# Patient Record
Sex: MALE | NOT HISPANIC OR LATINO | URBAN - METROPOLITAN AREA
[De-identification: names, ages, dates, MRNs, and addresses within clinical notes are randomized per-mention and may not be internally consistent; named-entity substitution may affect disease eponyms.]

---

## 2021-12-07 ENCOUNTER — OUTPATIENT (OUTPATIENT)
Dept: OUTPATIENT SERVICES | Facility: HOSPITAL | Age: 51
LOS: 1 days | Discharge: HOME | End: 2021-12-07

## 2022-10-19 ENCOUNTER — APPOINTMENT (OUTPATIENT)
Dept: NEUROSURGERY | Facility: CLINIC | Age: 52
End: 2022-10-19

## 2022-10-19 VITALS
DIASTOLIC BLOOD PRESSURE: 79 MMHG | OXYGEN SATURATION: 97 % | TEMPERATURE: 97 F | HEIGHT: 67 IN | WEIGHT: 180 LBS | SYSTOLIC BLOOD PRESSURE: 128 MMHG | HEART RATE: 84 BPM | BODY MASS INDEX: 28.25 KG/M2

## 2022-10-19 DIAGNOSIS — Z80.1 FAMILY HISTORY OF MALIGNANT NEOPLASM OF TRACHEA, BRONCHUS AND LUNG: ICD-10-CM

## 2022-10-19 PROCEDURE — 99203 OFFICE O/P NEW LOW 30 MIN: CPT

## 2022-10-19 RX ORDER — ALBUTEROL 90 MCG
AEROSOL (GRAM) INHALATION
Refills: 0 | Status: ACTIVE | COMMUNITY

## 2022-10-19 RX ORDER — METFORMIN HYDROCHLORIDE 625 MG/1
TABLET ORAL
Refills: 0 | Status: ACTIVE | COMMUNITY

## 2022-10-19 RX ORDER — CALCIUM CARBONATE/VITAMIN D3 600 MG-10
TABLET ORAL
Refills: 0 | Status: ACTIVE | COMMUNITY

## 2022-10-19 RX ORDER — LOSARTAN POTASSIUM 100 MG/1
TABLET, FILM COATED ORAL
Refills: 0 | Status: ACTIVE | COMMUNITY

## 2022-10-19 RX ORDER — ATORVASTATIN CALCIUM 80 MG/1
TABLET, FILM COATED ORAL
Refills: 0 | Status: ACTIVE | COMMUNITY

## 2022-10-19 NOTE — HISTORY OF PRESENT ILLNESS
[FreeTextEntry1] : Aneurysm [de-identified] : MARIELY CHENG is a 52 year old male with PMH of HTN, HLD, DM on metformin, no insulin, asthma, anxiety. Per patient report, father passed away from ruptured cerebral aneurysm in July 2022 at [facility]. His brother is a patient of ours as well who has history of cavernous aneurysm. PCP, Dr. Jarret Fishman ordered brain imaging due to extensive family history. 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 He is a former smoker of cigarettes, quit more than 5-7 years ago. He states he has occasional transient head pressure/cloudiness that he has had for a while for a couple years. He currently works at a  at a hotel.

## 2022-10-19 NOTE — ASSESSMENT
[FreeTextEntry1] : IMPRESSION:\par 52M with PMH of HTN, HLD, DM on metformin, asthma, and anxiety. Positive family history of ruptured cerebral aneurysm, father passed July 2022, brother with cavernous aneurysm. PCP, Dr. Jarret Fishman ordered MRA brain non con done 10/6 at [facility] demonstrating 7mm left ICA ophthalmic aneurysm. Former smoker of cigarettes, quit more than 5-7 years ago.\par \par Counseled patient on the natural history of aneurysms, discussed risk of aneurysm rupture with the patient and signs and symptoms of subarachnoid hemorrhage. Should he have severe, sudden onset worst headache of his life, advised that he must seek medical attention immediately and go to the emergency room if this occurs. \par Given we have limited dimensions/images from the MRA, I am not entirely sure about the size read on the official report of MRA.\par \par Recommend diagnostic cerebral angiogram as the initial step to evaluate unruptured aneurysm. The risks, benefits, alternatives, complications and personnel associated with the procedure were discussed with the patient in great detail. Explained that once we obtain more information from the angiogram, will consider treatment options for his particular aneurysm.Discussed possibility of endovascular embolization of the aneurysm with flow diversion. Would need to start aspirin 81mg daily and brilinta 90mg bid five-seven days before the procedure.\par  \par \par \par PLAN:\par PST and COVID PCR\par Schedule Diagnostic Cerebral Angiogram for next Thursday, 10/27/22\par

## 2022-10-19 NOTE — REVIEW OF SYSTEMS
[As Noted in HPI] : as noted in HPI [Negative] : Heme/Lymph [de-identified] : Occasional head pressure/cloudiness

## 2022-10-26 ENCOUNTER — OUTPATIENT (OUTPATIENT)
Dept: OUTPATIENT SERVICES | Facility: HOSPITAL | Age: 52
LOS: 1 days | End: 2022-10-26
Payer: MEDICAID

## 2022-10-26 VITALS
DIASTOLIC BLOOD PRESSURE: 95 MMHG | TEMPERATURE: 98 F | HEIGHT: 67 IN | WEIGHT: 182.1 LBS | RESPIRATION RATE: 14 BRPM | OXYGEN SATURATION: 97 % | SYSTOLIC BLOOD PRESSURE: 153 MMHG | HEART RATE: 87 BPM

## 2022-10-26 DIAGNOSIS — Z11.52 ENCOUNTER FOR SCREENING FOR COVID-19: ICD-10-CM

## 2022-10-26 DIAGNOSIS — I67.1 CEREBRAL ANEURYSM, NONRUPTURED: ICD-10-CM

## 2022-10-26 DIAGNOSIS — Z01.818 ENCOUNTER FOR OTHER PREPROCEDURAL EXAMINATION: ICD-10-CM

## 2022-10-26 DIAGNOSIS — Z98.890 OTHER SPECIFIED POSTPROCEDURAL STATES: Chronic | ICD-10-CM

## 2022-10-26 LAB
A1C WITH ESTIMATED AVERAGE GLUCOSE RESULT: 5.8 % — HIGH (ref 4–5.6)
ANION GAP SERPL CALC-SCNC: 13 MMOL/L — SIGNIFICANT CHANGE UP (ref 5–17)
BLD GP AB SCN SERPL QL: NEGATIVE — SIGNIFICANT CHANGE UP
BUN SERPL-MCNC: 13 MG/DL — SIGNIFICANT CHANGE UP (ref 7–23)
CALCIUM SERPL-MCNC: 10.3 MG/DL — SIGNIFICANT CHANGE UP (ref 8.4–10.5)
CHLORIDE SERPL-SCNC: 103 MMOL/L — SIGNIFICANT CHANGE UP (ref 96–108)
CO2 SERPL-SCNC: 23 MMOL/L — SIGNIFICANT CHANGE UP (ref 22–31)
CREAT SERPL-MCNC: 0.73 MG/DL — SIGNIFICANT CHANGE UP (ref 0.5–1.3)
EGFR: 110 ML/MIN/1.73M2 — SIGNIFICANT CHANGE UP
ESTIMATED AVERAGE GLUCOSE: 120 MG/DL — HIGH (ref 68–114)
GLUCOSE SERPL-MCNC: 120 MG/DL — HIGH (ref 70–99)
HCT VFR BLD CALC: 46.1 % — SIGNIFICANT CHANGE UP (ref 39–50)
HGB BLD-MCNC: 15.7 G/DL — SIGNIFICANT CHANGE UP (ref 13–17)
MCHC RBC-ENTMCNC: 32 PG — SIGNIFICANT CHANGE UP (ref 27–34)
MCHC RBC-ENTMCNC: 34.1 GM/DL — SIGNIFICANT CHANGE UP (ref 32–36)
MCV RBC AUTO: 94.1 FL — SIGNIFICANT CHANGE UP (ref 80–100)
NRBC # BLD: 0 /100 WBCS — SIGNIFICANT CHANGE UP (ref 0–0)
PLATELET # BLD AUTO: 209 K/UL — SIGNIFICANT CHANGE UP (ref 150–400)
POTASSIUM SERPL-MCNC: 3.5 MMOL/L — SIGNIFICANT CHANGE UP (ref 3.5–5.3)
POTASSIUM SERPL-SCNC: 3.5 MMOL/L — SIGNIFICANT CHANGE UP (ref 3.5–5.3)
RBC # BLD: 4.9 M/UL — SIGNIFICANT CHANGE UP (ref 4.2–5.8)
RBC # FLD: 12.3 % — SIGNIFICANT CHANGE UP (ref 10.3–14.5)
RH IG SCN BLD-IMP: POSITIVE — SIGNIFICANT CHANGE UP
SODIUM SERPL-SCNC: 139 MMOL/L — SIGNIFICANT CHANGE UP (ref 135–145)
WBC # BLD: 5.44 K/UL — SIGNIFICANT CHANGE UP (ref 3.8–10.5)
WBC # FLD AUTO: 5.44 K/UL — SIGNIFICANT CHANGE UP (ref 3.8–10.5)

## 2022-10-26 PROCEDURE — 85027 COMPLETE CBC AUTOMATED: CPT

## 2022-10-26 PROCEDURE — U0003: CPT

## 2022-10-26 PROCEDURE — 83036 HEMOGLOBIN GLYCOSYLATED A1C: CPT

## 2022-10-26 PROCEDURE — 86900 BLOOD TYPING SEROLOGIC ABO: CPT

## 2022-10-26 PROCEDURE — 80048 BASIC METABOLIC PNL TOTAL CA: CPT

## 2022-10-26 PROCEDURE — C9803: CPT

## 2022-10-26 PROCEDURE — 86850 RBC ANTIBODY SCREEN: CPT

## 2022-10-26 PROCEDURE — U0005: CPT

## 2022-10-26 PROCEDURE — 86901 BLOOD TYPING SEROLOGIC RH(D): CPT

## 2022-10-26 PROCEDURE — G0463: CPT

## 2022-10-26 NOTE — H&P PST ADULT - NEGATIVE NEUROLOGICAL SYMPTOMS
no transient paralysis/no weakness/no paresthesias/no syncope/no loss of sensation/no difficulty walking

## 2022-10-26 NOTE — H&P PST ADULT - HISTORY OF PRESENT ILLNESS
53 y/o M with PMHx of HTN, HLD, DM on metformin, asthma (intubated 1994 2/2 to exacerbation, after that well controlled with albuterol inhaler), and anxiety. Positive family history of ruptured cerebral aneurysm, father passed July 2022, brother with cavernous aneurysm. PCP, Dr. Jarret Fishman ordered MRA brain non con done 10/6 at [facility] demonstrating 7mm left ICA ophthalmic aneurysm. Former smoker of cigarettes, quit more than 5-7 years ago.  Recommend diagnostic cerebral angiogram as the initial step to evaluate unruptured aneurysm. Scheduled for Cerebral angiogram on 10/27/22.  Patient denies aspirin or Brilinta.   Patient denies SOB, CP, palpitation, blood in the stool or urine, N/V/D/C, dizziness, seizures, vision changes.  Endorses headaches that are chronic since childhood    Hx of Covid-19 Infx. 9/2022- fever, headache, SOB- home test- stayed, no need to go to the hospital or urgent care- Now back to baseline  Covid swab today (10/26/22)

## 2022-10-26 NOTE — H&P PST ADULT - PROBLEM SELECTOR PLAN 1
Dx: Cerebral angiogram on 10/27/22.  PST labs pending  AC: None  Covid swab- done today  Metformin- Last dose 10/24/22-PM

## 2022-10-26 NOTE — H&P PST ADULT - NSICDXPASTMEDICALHX_GEN_ALL_CORE_FT
PAST MEDICAL HISTORY:  Anxiety     Asthma     History of cerebral aneurysm     HLD (hyperlipidemia)     HTN (hypertension)

## 2022-10-26 NOTE — H&P PST ADULT - ANTERIOR CERVICAL R
Detail Level: Zone Render Risk Assessment In Note?: yes Note Text (......Xxx Chief Complaint.): This diagnosis correlates with the normal

## 2022-10-27 ENCOUNTER — APPOINTMENT (OUTPATIENT)
Dept: NEUROSURGERY | Facility: HOSPITAL | Age: 52
End: 2022-10-27

## 2022-10-27 ENCOUNTER — OUTPATIENT (OUTPATIENT)
Dept: OUTPATIENT SERVICES | Facility: HOSPITAL | Age: 52
LOS: 1 days | End: 2022-10-27
Payer: MEDICAID

## 2022-10-27 ENCOUNTER — TRANSCRIPTION ENCOUNTER (OUTPATIENT)
Age: 52
End: 2022-10-27

## 2022-10-27 VITALS
RESPIRATION RATE: 18 BRPM | SYSTOLIC BLOOD PRESSURE: 127 MMHG | DIASTOLIC BLOOD PRESSURE: 92 MMHG | HEART RATE: 74 BPM | TEMPERATURE: 98 F | OXYGEN SATURATION: 100 %

## 2022-10-27 VITALS
RESPIRATION RATE: 16 BRPM | OXYGEN SATURATION: 98 % | HEART RATE: 76 BPM | DIASTOLIC BLOOD PRESSURE: 81 MMHG | SYSTOLIC BLOOD PRESSURE: 133 MMHG

## 2022-10-27 DIAGNOSIS — I67.1 CEREBRAL ANEURYSM, NONRUPTURED: ICD-10-CM

## 2022-10-27 DIAGNOSIS — Z98.890 OTHER SPECIFIED POSTPROCEDURAL STATES: Chronic | ICD-10-CM

## 2022-10-27 LAB
GLUCOSE BLDC GLUCOMTR-MCNC: 124 MG/DL — HIGH (ref 70–99)
SARS-COV-2 RNA SPEC QL NAA+PROBE: SIGNIFICANT CHANGE UP

## 2022-10-27 PROCEDURE — C1887: CPT

## 2022-10-27 PROCEDURE — 36224 PLACE CATH CAROTD ART: CPT | Mod: 50

## 2022-10-27 PROCEDURE — 36227 PLACE CATH XTRNL CAROTID: CPT | Mod: 50

## 2022-10-27 PROCEDURE — 36227 PLACE CATH XTRNL CAROTID: CPT

## 2022-10-27 PROCEDURE — 82962 GLUCOSE BLOOD TEST: CPT

## 2022-10-27 PROCEDURE — 36224 PLACE CATH CAROTD ART: CPT

## 2022-10-27 PROCEDURE — 76377 3D RENDER W/INTRP POSTPROCES: CPT | Mod: 26

## 2022-10-27 PROCEDURE — 36226 PLACE CATH VERTEBRAL ART: CPT | Mod: RT

## 2022-10-27 PROCEDURE — C1894: CPT

## 2022-10-27 PROCEDURE — 36226 PLACE CATH VERTEBRAL ART: CPT

## 2022-10-27 PROCEDURE — C1769: CPT

## 2022-10-27 RX ORDER — SODIUM CHLORIDE 9 MG/ML
1000 INJECTION INTRAMUSCULAR; INTRAVENOUS; SUBCUTANEOUS
Refills: 0 | Status: DISCONTINUED | OUTPATIENT
Start: 2022-10-27 | End: 2022-11-10

## 2022-10-27 NOTE — CHART NOTE - NSCHARTNOTEFT_GEN_A_CORE
Neurointerventional Surgery Post Procedure Note    Procedure: Selective Cerebral Angiography     Pre- Procedure Diagnosis: Cerebral Aneurysm   Post- Procedure Diagnosis: Cerebral Aneurysm    Pre- Procedure Diagnosis: Arteriovenous malformation  Post- Procedure Diagnosis: Arteriovenous malformation    Pre- Procedure Diagnosis: Stroke   Post- Procedure Diagnosis: Stroke    : Dr. Terry BLANCO  Fellow: Dr. Enedelia BLANCO  Physician Assistant: Patty Guzmán PA-C  Nurse: RN  Anesthesiologist: Dr. Forest BLANCO, Obi Lakhani CRNA  Radiology Tech: Rosemary Cano RT, Arvind Wheeler RT     Sheath:  - [place]an Sheath    I/Os: estimated blood loss less than 10cc,  IV fluids cc, Urine output cc, Contrast: Omnipaque 240   cc, ABX    Vitals:  BP   HR   Spo2  %    Preliminary Report:  Under a 4 [place]an short sheath via the right groin under MAC sedation via left vertebral artery, left internal carotid artery, left external carotid artery, right vertebral artery, right internal carotid artery, right external carotid artery, a selective cerebral angiography  was performed and reveals       . ( Official note to follow).    Patient tolerated procedure well.  Patient remains hemodynamically stable, no change in neurological status compared to baseline.  Results were discussed with patient, patient's family and Neurosurgery.  Right groin sheath  was discontinued. Hemostasis was obtained with approximately 15 minutes of manual compression.     No active bleeding, no hematoma, no ecchymosis.   Quick clot and safeguard balloon dressing applied at   Patient transferred to recovery room in stable condition. Neurointerventional Surgery Post Procedure Note    Procedure: Selective Cerebral Angiography     Pre- Procedure Diagnosis: L ophthalmic artery aneurysm   Post- Procedure Diagnosis: L ophthalmic artery aneurysm     : Dr. Terry BLANCO  Fellow: Dr. Enedelia BLANCO  Physician Assistant: Patty Guzmán PA-C  Nurse: Aziza Luque RN  Anesthesiologist: Dr. Forest BLANCO, Obi Lakhani CRNA  Radiology Tech: Kareem Toribio RT, Arvind Wheeler RT     Sheath:  5 Lithuanian Sheath    I/Os: estimated blood loss less than 10cc,  IV fluids cc, Urine output cc, Contrast: Omnipaque 240  67 cc    Vitals:  /63  HR 65  Spo2  100%    Preliminary Report:  Under a 4 [place]ian short sheath via the right groin under MAC sedation via left vertebral artery, left internal carotid artery, left external carotid artery, right vertebral artery, right internal carotid artery, right external carotid artery, a selective cerebral angiography  was performed and reveals       . ( Official note to follow).    Patient tolerated procedure well.  Patient remains hemodynamically stable, no change in neurological status compared to baseline.  Results were discussed with patient, patient's family and Neurosurgery.  Right groin sheath  was discontinued. Hemostasis was obtained with approximately 15 minutes of manual compression.     No active bleeding, no hematoma, no ecchymosis.   Quick clot and safeguard balloon dressing applied at   Patient transferred to recovery room in stable condition. Neurointerventional Surgery Post Procedure Note    Procedure: Selective Cerebral Angiography     Pre- Procedure Diagnosis: L ophthalmic artery aneurysm   Post- Procedure Diagnosis: L ophthalmic artery aneurysm     : Dr. Terry BLANCO  Fellow: Dr. Enedelia BLANCO  Physician Assistant: Patty Guzmán PA-C  Nurse: Aziza Luque RN  Anesthesiologist: Dr. Forest BLANCO, Obi Lakhani CRNA  Radiology Tech: Kareem Toribio RT, Arvind Wheeler RT     Sheath:  5 [place] Sheath    I/Os: estimated blood loss less than 10cc,  IV fluids 300cc, Urine output 0cc, Contrast: Omnipaque 240  67 cc    Vitals:  /63  HR 65  Spo2  100%    Preliminary Report:  Under a 5 [place] short sheath via the right wrist under MAC sedation via left vertebral artery, left internal carotid artery, left external carotid artery, right internal carotid artery, a selective cerebral angiography  was performed and reveals L pcomm aneurysm. ( Official note to follow).    Patient tolerated procedure well.  Patient remains hemodynamically stable, no change in neurological status compared to baseline.  Results were discussed with patient, patient's family and Neurosurgery.  Right wrist sheath  was discontinued using radial band with 12cc inflated applied at 11am   No active bleeding, no hematoma, no ecchymosis.    Patient transferred to recovery room in stable condition.

## 2022-10-27 NOTE — CHART NOTE - NSCHARTNOTEFT_GEN_A_CORE
Neurointerventional Surgery  Pre-Procedure Note       HPI:  52M with PMH HTN, HLD, DM, asthma, anxiety, family history cerebral aneurysm who presents today for selective cerebral angiography. Patient reports that his father had ruptured intracranial aneurysm with SAH and passed away July 2022, and subsequently his brother was found to have cavernous aneurysm. Patient was then screened and found to have L ICA ophthalmic artery aneurysm. Patient presents toady for cerebral angiogram. Per patient he feels well today, has no acute symptoms including HA, weakness, numbness, tingling, N/V, CP, SOB.       Allergies: No Known Allergies      PAST MEDICAL & SURGICAL HISTORY:  History of cerebral aneurysm  HTN (hypertension)  HLD (hyperlipidemia)  Asthma  Anxiety  H/O colonoscopy, 2 years ago      FAMILY HISTORY:  Father with ruptured aneurysm, passed away July 2022  Brother with cavernous aneurysm       Physical Exam:  Constitutional: NAD, lying in bed  Neuro  * Mental Status:  GCS 15: Awake, alert, oriented to conversation. No aphasia or difficulty speaking. No dysarthria. Able to name objects and their function.  * Cranial Nerves: Cnii-Cnxii grossly intact. PERRL, EOMI, tongue midline, no gaze deviation  * Motor: RUE 5/5, LUE 5/5, RLE 5/5, LLE 5/5, no drift or dysmetria   * Sensory: Sensation intact to light touch  * Reflexes: not assessed   Cardiovascular: Regular rate and rhythm.  Eyes: See neurologic examination with detailed examination of eyes.  ENT: No JVD, Trachea Midline  Respiratory: non labored breathing   Gastrointestinal: Soft, nontender, nondistended.  Genitourinary: [ ] Mcdonnell, [ x ] No Mcdonnell.   Musculoskeletal: No muscle wasting noted, (See neurologic assessment for full muscle strength assessment) No pretibial edema appreciated, no appreciable calf tenderness.  Skin:  no wounds, no redness, no abrasions noted  Hematologic / Lymph / Immunologic: No bleeding from IV sites or wounds, No lymphadenopathy, No Hives or allergic type skin lesions      [facility] SS:  DATE: 10/27/22  TIME: 0930  1A: Level of consciousness (0-3): 0  1B: Questions (0-2): 0    1C: Commands (0-2): 0  2: Gaze (0-2): 0  3: Visual fields (0-3): 0  4: Facial palsy (0-3): 0  MOTOR:  5A: Left arm motor drift (0-4): 0  5B: Right arm motor drift (0-4): 0  6A: Left leg motor drift (0-4): 0  6B: Right leg motor drift (0-4): 0  7: Limb ataxia (0-2): 0  SENSORY:  8: Sensation (0-2): 0  SPEECH:  9: Language (0-3): 0  10: Dysarthria (0-2): 0  EXTINCTION:  11: Extinction/inattention (0-2): 0    TOTAL SCORE: 0      Labs:                         15.7   5.44  )-----------( 209      ( 26 Oct 2022 12:56 )             46.1       10-26    139  |  103  |  13  ----------------------------<  120<H>  3.5   |  23  |  0.73    Ca    10.3      26 Oct 2022 12:55      Assessment/Plan:   This is a 52y  year old right hand dominant Male  presents with L ICA ophthalmic artery aneurysm on MRA> Patient presents to neuro-IR for selective cerebral angiography.     Procedure, goals, risks, benefits and alternatives  were discussed with patient.  All questions were answered.  Risks include but are not limited to stroke, vessel injury, hemorrhage, and or groin hematoma.  Patient demonstrates understanding  of all risks involved with this procedure and wishes to continue.   Appropriate  consent was obtained from patient and consent is in the patient's chart.

## 2022-10-27 NOTE — ASU DISCHARGE PLAN (ADULT/PEDIATRIC) - NURSING INSTRUCTIONS
Please feel free to contact us at [phone] if any problems arise. After 6PM, Monday through Friday, on weekends and on holidays, please call [phone] and ask for the radiology resident on call to be paged.

## 2022-10-27 NOTE — ASU DISCHARGE PLAN (ADULT/PEDIATRIC) - CARE PROVIDER_API CALL
Anderson Stewart (MD; MS)  Unallocated  [address] [facility], [address] [place] [id number]  Phone: [phone]  Fax: [phone]  Follow Up Time: 2 weeks

## 2022-10-27 NOTE — ASU DISCHARGE PLAN (ADULT/PEDIATRIC) - NS MD DC FALL RISK RISK
Addended by: MONTEZ NAVARRO on: 4/22/2022 02:29 PM     Modules accepted: Orders     For information on Fall & Injury Prevention, visit: [email or web address] OR  [email or web address] OR  https://www.cdc.gov/steadi/patient.html

## 2022-11-02 PROBLEM — E78.5 HYPERLIPIDEMIA, UNSPECIFIED: Chronic | Status: ACTIVE | Noted: 2022-10-26

## 2022-11-02 PROBLEM — J45.909 UNSPECIFIED ASTHMA, UNCOMPLICATED: Chronic | Status: ACTIVE | Noted: 2022-10-26

## 2022-11-02 PROBLEM — I10 ESSENTIAL (PRIMARY) HYPERTENSION: Chronic | Status: ACTIVE | Noted: 2022-10-26

## 2022-11-02 PROBLEM — Z86.79 PERSONAL HISTORY OF OTHER DISEASES OF THE CIRCULATORY SYSTEM: Chronic | Status: ACTIVE | Noted: 2022-10-26

## 2022-11-09 ENCOUNTER — APPOINTMENT (OUTPATIENT)
Dept: NEUROSURGERY | Facility: CLINIC | Age: 52
End: 2022-11-09

## 2022-11-09 DIAGNOSIS — I67.1 CEREBRAL ANEURYSM, NONRUPTURED: ICD-10-CM

## 2022-11-09 PROCEDURE — 99214 OFFICE O/P EST MOD 30 MIN: CPT | Mod: 95

## 2022-11-11 NOTE — HISTORY OF PRESENT ILLNESS
[FreeTextEntry1] : MARIELY CHENG is a 52 year old male with PMH of HTN, HLD, DM on metformin, no insulin, asthma, anxiety. Per patient report, father passed away from ruptured cerebral aneurysm in July 2022 at [facility]. His brother is a patient of ours as well who has history of cavernous aneurysm. PCP, Dr. Jarret Fishman ordered brain imaging due to extensive family history. 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 He is a former smoker of cigarettes, quit more than 5-7 years ago. He states he has occasional transient head pressure/cloudiness that he has had for a while for a couple years. On 10/27/22, he underwent diagnostic catheter cerebral angiogram demonstrating 5.6mm left ICA ophthalmic artery aneurysm.\par \par Today on his post-hospitalization visit after his angiogram, he is neurologically intact, doing well with no acute complaints. Denies issues with puncture site. He changed his visit from in person to telehealth due to his recent ankle injury.

## 2022-11-11 NOTE — ASSESSMENT
[FreeTextEntry1] : IMPRESSION:\par 52M with PMH of HTN, HLD, DM on metformin, asthma, and anxiety. Positive family history of ruptured cerebral aneurysm, father passed July 2022, brother with cavernous aneurysm. PCP, Dr. Jarret Fishman ordered MRA brain non con done 10/6 at [facility] demonstrating 7mm left ICA ophthalmic aneurysm. Former smoker of cigarettes, quit more than 5-7 years ago. s/p dx angio 10/27 demonstrating 5.6mm left ICA ophthalmic aneurysm. \par \par Counseled patient on the natural history of aneurysms, discussed risk of aneurysm rupture with the patient and signs and symptoms of subarachnoid hemorrhage. Should he have severe, sudden onset worst headache of his life, advised that he must seek medical attention immediately and go to the emergency room if this occurs.\par \par Discussed management options moving forward which include conservative management with serial scans. The risk of aneurysm rupture in his case is about 3% per year. \par Explained that the results of the angiogram are not as straight forward as expected. With the family history, we tend to lean more on the aggressive end in terms of treatment. Given the morphology and anatomical structure of the aneurysm, recommend endovascular embolization of the aneurysm with flow diverting stent. Assured there is no immediate urgency to proceeding with treatment. The risks, benefits, alternatives, complications and personnel associated with the procedure were discussed with the patient in great detail. Because of the ophthalmic artery comes right out of the aneurysm, there is a risk of it affecting his vision. Patient reports baseline vision issues in his left eye. This endovascular procedure requires starting Aspirin 81mg daily and Brilinta 90mg BID 5-7 days before the procedure and for 6 months following the procedure. \par  \par \par PLAN:\par If his ankle injury necessitates surgery, recommend undergoing surgical treatment for his ankle first prior to starting blood thinners that is required to be started in preparation for eventual endovascular embolization\par Plan to follow up with patient in the next couple weeks after his work-up of his ankle\par Tentatively schedule cerebral angiogram and embolization of aneurysm \par

## 2022-11-11 NOTE — REASON FOR VISIT
[Home] : at home, [unfilled] , at the time of the visit. [Medical Office: ([facility])___] : at the medical office located in  [Patient] : the patient [Self] : self [Post Hospitalization] : a post hospitalization visit [This encounter was initiated by telehealth (audio with video) and converted to telephone (audio only) due to technical difficulties.] : This encounter was initiated by telehealth (audio with video) and converted to telephone (audio only) due to technical difficulties. [FreeTextEntry1] : s/p Diagnostic cerebral angiogram 10/27/22

## 2023-01-19 ENCOUNTER — NON-APPOINTMENT (OUTPATIENT)
Age: 53
End: 2023-01-19

## 2023-01-24 ENCOUNTER — OUTPATIENT (OUTPATIENT)
Dept: OUTPATIENT SERVICES | Facility: HOSPITAL | Age: 53
LOS: 1 days | End: 2023-01-24
Payer: MEDICAID

## 2023-01-24 VITALS
HEIGHT: 67 IN | OXYGEN SATURATION: 97 % | SYSTOLIC BLOOD PRESSURE: 138 MMHG | RESPIRATION RATE: 16 BRPM | HEART RATE: 86 BPM | WEIGHT: 186.07 LBS | TEMPERATURE: 98 F | DIASTOLIC BLOOD PRESSURE: 77 MMHG

## 2023-01-24 DIAGNOSIS — E11.9 TYPE 2 DIABETES MELLITUS WITHOUT COMPLICATIONS: ICD-10-CM

## 2023-01-24 DIAGNOSIS — I67.1 CEREBRAL ANEURYSM, NONRUPTURED: ICD-10-CM

## 2023-01-24 DIAGNOSIS — Z98.890 OTHER SPECIFIED POSTPROCEDURAL STATES: Chronic | ICD-10-CM

## 2023-01-24 DIAGNOSIS — Z11.52 ENCOUNTER FOR SCREENING FOR COVID-19: ICD-10-CM

## 2023-01-24 DIAGNOSIS — Z01.818 ENCOUNTER FOR OTHER PREPROCEDURAL EXAMINATION: ICD-10-CM

## 2023-01-24 LAB
A1C WITH ESTIMATED AVERAGE GLUCOSE RESULT: 5.8 % — HIGH (ref 4–5.6)
ANION GAP SERPL CALC-SCNC: 14 MMOL/L — SIGNIFICANT CHANGE UP (ref 5–17)
BLD GP AB SCN SERPL QL: NEGATIVE — SIGNIFICANT CHANGE UP
BUN SERPL-MCNC: 13 MG/DL — SIGNIFICANT CHANGE UP (ref 7–23)
CALCIUM SERPL-MCNC: 10.2 MG/DL — SIGNIFICANT CHANGE UP (ref 8.4–10.5)
CHLORIDE SERPL-SCNC: 100 MMOL/L — SIGNIFICANT CHANGE UP (ref 96–108)
CO2 SERPL-SCNC: 23 MMOL/L — SIGNIFICANT CHANGE UP (ref 22–31)
CREAT SERPL-MCNC: 0.8 MG/DL — SIGNIFICANT CHANGE UP (ref 0.5–1.3)
EGFR: 107 ML/MIN/1.73M2 — SIGNIFICANT CHANGE UP
ESTIMATED AVERAGE GLUCOSE: 120 MG/DL — HIGH (ref 68–114)
GLUCOSE SERPL-MCNC: 117 MG/DL — HIGH (ref 70–99)
HCT VFR BLD CALC: 48.7 % — SIGNIFICANT CHANGE UP (ref 39–50)
HGB BLD-MCNC: 16.7 G/DL — SIGNIFICANT CHANGE UP (ref 13–17)
MCHC RBC-ENTMCNC: 30.9 PG — SIGNIFICANT CHANGE UP (ref 27–34)
MCHC RBC-ENTMCNC: 34.3 GM/DL — SIGNIFICANT CHANGE UP (ref 32–36)
MCV RBC AUTO: 90.2 FL — SIGNIFICANT CHANGE UP (ref 80–100)
NRBC # BLD: 0 /100 WBCS — SIGNIFICANT CHANGE UP (ref 0–0)
PA ADP PRP-ACNC: 5 PRU — LOW (ref 194–417)
PLATELET # BLD AUTO: 220 K/UL — SIGNIFICANT CHANGE UP (ref 150–400)
PLATELET RESPONSE ASPIRIN RESULT: 350 ARU — SIGNIFICANT CHANGE UP (ref 350–700)
POTASSIUM SERPL-MCNC: 3.3 MMOL/L — LOW (ref 3.5–5.3)
POTASSIUM SERPL-SCNC: 3.3 MMOL/L — LOW (ref 3.5–5.3)
RBC # BLD: 5.4 M/UL — SIGNIFICANT CHANGE UP (ref 4.2–5.8)
RBC # FLD: 12.1 % — SIGNIFICANT CHANGE UP (ref 10.3–14.5)
RH IG SCN BLD-IMP: POSITIVE — SIGNIFICANT CHANGE UP
SARS-COV-2 RNA SPEC QL NAA+PROBE: SIGNIFICANT CHANGE UP
SODIUM SERPL-SCNC: 137 MMOL/L — SIGNIFICANT CHANGE UP (ref 135–145)
WBC # BLD: 6.52 K/UL — SIGNIFICANT CHANGE UP (ref 3.8–10.5)
WBC # FLD AUTO: 6.52 K/UL — SIGNIFICANT CHANGE UP (ref 3.8–10.5)

## 2023-01-24 PROCEDURE — C9803: CPT

## 2023-01-24 PROCEDURE — G0463: CPT

## 2023-01-24 PROCEDURE — 83036 HEMOGLOBIN GLYCOSYLATED A1C: CPT

## 2023-01-24 PROCEDURE — 86850 RBC ANTIBODY SCREEN: CPT

## 2023-01-24 PROCEDURE — 86900 BLOOD TYPING SEROLOGIC ABO: CPT

## 2023-01-24 PROCEDURE — 86901 BLOOD TYPING SEROLOGIC RH(D): CPT

## 2023-01-24 PROCEDURE — U0003: CPT

## 2023-01-24 PROCEDURE — U0005: CPT

## 2023-01-24 PROCEDURE — 80048 BASIC METABOLIC PNL TOTAL CA: CPT

## 2023-01-24 PROCEDURE — 85576 BLOOD PLATELET AGGREGATION: CPT

## 2023-01-24 PROCEDURE — 85027 COMPLETE CBC AUTOMATED: CPT

## 2023-01-24 RX ORDER — ATORVASTATIN CALCIUM 80 MG/1
1 TABLET, FILM COATED ORAL
Qty: 0 | Refills: 0 | DISCHARGE

## 2023-01-24 NOTE — H&P PST ADULT - PROBLEM SELECTOR PLAN 1
Dx: Cerebral angiogram on 10/27/22.  PST labs pending  AC: None  Covid swab- done today  Metformin- Last dose 10/24/22-PM cerebral angiogram and embolization  PST labs send  preprocedure surgical scrub instructions discussed   continue Aspirin and Brilinta am of sx

## 2023-01-24 NOTE — H&P PST ADULT - NSICDXPASTMEDICALHX_GEN_ALL_CORE_FT
PAST MEDICAL HISTORY:  2019 novel coronavirus disease (COVID-19)     Ankle injury     Anxiety     Anxiety and depression     Asthma     DM2 (diabetes mellitus, type 2)     History of cerebral aneurysm     HLD (hyperlipidemia)     HTN (hypertension)      PAST MEDICAL HISTORY:  2019 novel coronavirus disease (COVID-19)     Ankle injury     Anxiety     Anxiety and depression     Asthma     DM2 (diabetes mellitus, type 2)     Fall     History of cerebral aneurysm     HLD (hyperlipidemia)     HTN (hypertension)

## 2023-01-24 NOTE — H&P PST ADULT - PRIMARY CARE PROVIDER
Sravanthi Orcnfrg-[phone] last visit 2 weeks ago Sravanthi Mysahjv-[phone] last visit medical 2 weeks ago

## 2023-01-24 NOTE — H&P PST ADULT - GASTROINTESTINAL
negative soft/nontender/nondistended/no masses palpable soft/nontender/normal active bowel sounds/no masses palpable

## 2023-01-24 NOTE — H&P PST ADULT - HISTORY OF PRESENT ILLNESS
51 y/o M with PMHx of HTN, HLD, DM on metformin, asthma (intubated 1994 2/2 to exacerbation, after that well controlled with albuterol inhaler ), covid 19 mild 9/2022 and anxiety. Positive family history of ruptured cerebral aneurysm, father passed July 2022, brother with cavernous aneurysm. PCP, MRA brain 10/2022 7mm left ICA ophthalmic aneurysm s/p diagnostic angiogram 10/27/22 demonstrating 5.6mm left ICA ophthalmic aneurysm now planned for cerebral angiogram and embolization 1/26/23    Patient denies SOB, CP, palpitation, blood in the stool or urine, N/V/D/C, dizziness, seizures, vision changes.  Covid swab 1/24 @ [facility]  denies any recent covid infection or exposure       53 y/o M with PMHx of HTN, HLD, DM on metformin, asthma (intubated 1994 2/2 to exacerbation, after that well controlled with albuterol inhaler ), covid 19 mild 9/2022 and anxiety. 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 blount   Patient denies SOB, CP, palpitation,  N/V/D/C, dizziness, seizures, vision changes.  Covid swab 1/24 @ [facility]  denies any recent covid infection or exposure       53 y/o M with PMHx of HTN, HLD, DM2 on metformin, asthma (intubated 1994 2/2 to exacerbation, after that controlled with albuterol inhaler as needed ), covid 19 mild 9/2022 and anxiety. 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 blount   Patient denies SOB, CP, palpitation,  N/V/D/C, dizziness, seizures, vision changes.  Covid swab 1/24 @ [facility]  denies any recent covid infection or exposure

## 2023-01-24 NOTE — H&P PST ADULT - PSYCHIATRIC
negative normal/normal affect/alert and oriented x3/normal behavior normal affect/alert and oriented x3/normal behavior

## 2023-01-24 NOTE — H&P PST ADULT - MUSCULOSKELETAL
ROM intact/no joint swelling/no joint erythema/no joint warmth/no calf tenderness/normal gait/strength 5/5 bilateral upper extremities/strength 5/5 bilateral lower extremities negative no calf tenderness/normal gait/back exam

## 2023-01-24 NOTE — H&P PST ADULT - RESPIRATORY
clear to auscultation bilaterally/respirations non-labored clear to auscultation bilaterally/airway patent/good air movement/respirations non-labored

## 2023-01-26 ENCOUNTER — APPOINTMENT (OUTPATIENT)
Dept: NEUROSURGERY | Facility: HOSPITAL | Age: 53
End: 2023-01-26

## 2023-01-26 ENCOUNTER — INPATIENT (INPATIENT)
Facility: HOSPITAL | Age: 53
LOS: 0 days | Discharge: HOME | DRG: 27 | End: 2023-01-27
Attending: NEUROLOGICAL SURGERY | Admitting: NEUROLOGICAL SURGERY
Payer: MEDICAID

## 2023-01-26 VITALS
OXYGEN SATURATION: 98 % | SYSTOLIC BLOOD PRESSURE: 153 MMHG | DIASTOLIC BLOOD PRESSURE: 91 MMHG | HEIGHT: 67 IN | RESPIRATION RATE: 15 BRPM | WEIGHT: 186.07 LBS | TEMPERATURE: 98 F | HEART RATE: 90 BPM

## 2023-01-26 DIAGNOSIS — Z98.890 OTHER SPECIFIED POSTPROCEDURAL STATES: Chronic | ICD-10-CM

## 2023-01-26 DIAGNOSIS — I67.1 CEREBRAL ANEURYSM, NONRUPTURED: ICD-10-CM

## 2023-01-26 LAB
ANION GAP SERPL CALC-SCNC: 12 MMOL/L — SIGNIFICANT CHANGE UP (ref 5–17)
BUN SERPL-MCNC: 10 MG/DL — SIGNIFICANT CHANGE UP (ref 7–23)
CALCIUM SERPL-MCNC: 9 MG/DL — SIGNIFICANT CHANGE UP (ref 8.4–10.5)
CHLORIDE SERPL-SCNC: 107 MMOL/L — SIGNIFICANT CHANGE UP (ref 96–108)
CO2 SERPL-SCNC: 23 MMOL/L — SIGNIFICANT CHANGE UP (ref 22–31)
CREAT SERPL-MCNC: 0.78 MG/DL — SIGNIFICANT CHANGE UP (ref 0.5–1.3)
EGFR: 108 ML/MIN/1.73M2 — SIGNIFICANT CHANGE UP
GLUCOSE BLDC GLUCOMTR-MCNC: 113 MG/DL — HIGH (ref 70–99)
GLUCOSE BLDC GLUCOMTR-MCNC: 118 MG/DL — HIGH (ref 70–99)
GLUCOSE BLDC GLUCOMTR-MCNC: 138 MG/DL — HIGH (ref 70–99)
GLUCOSE SERPL-MCNC: 131 MG/DL — HIGH (ref 70–99)
HCT VFR BLD CALC: 41.6 % — SIGNIFICANT CHANGE UP (ref 39–50)
HCT VFR BLD CALC: 41.7 % — SIGNIFICANT CHANGE UP (ref 39–50)
HGB BLD-MCNC: 14 G/DL — SIGNIFICANT CHANGE UP (ref 13–17)
HGB BLD-MCNC: 14.1 G/DL — SIGNIFICANT CHANGE UP (ref 13–17)
MAGNESIUM SERPL-MCNC: 1.7 MG/DL — SIGNIFICANT CHANGE UP (ref 1.6–2.6)
MCHC RBC-ENTMCNC: 30.5 PG — SIGNIFICANT CHANGE UP (ref 27–34)
MCHC RBC-ENTMCNC: 30.5 PG — SIGNIFICANT CHANGE UP (ref 27–34)
MCHC RBC-ENTMCNC: 33.6 GM/DL — SIGNIFICANT CHANGE UP (ref 32–36)
MCHC RBC-ENTMCNC: 33.9 GM/DL — SIGNIFICANT CHANGE UP (ref 32–36)
MCV RBC AUTO: 90 FL — SIGNIFICANT CHANGE UP (ref 80–100)
MCV RBC AUTO: 90.8 FL — SIGNIFICANT CHANGE UP (ref 80–100)
NRBC # BLD: 0 /100 WBCS — SIGNIFICANT CHANGE UP (ref 0–0)
NRBC # BLD: 0 /100 WBCS — SIGNIFICANT CHANGE UP (ref 0–0)
PA ADP PRP-ACNC: 8 PRU — LOW (ref 194–417)
PHOSPHATE SERPL-MCNC: 3 MG/DL — SIGNIFICANT CHANGE UP (ref 2.5–4.5)
PLATELET # BLD AUTO: 164 K/UL — SIGNIFICANT CHANGE UP (ref 150–400)
PLATELET # BLD AUTO: 186 K/UL — SIGNIFICANT CHANGE UP (ref 150–400)
PLATELET RESPONSE ASPIRIN RESULT: 453 ARU — SIGNIFICANT CHANGE UP (ref 350–700)
POTASSIUM SERPL-MCNC: 3.6 MMOL/L — SIGNIFICANT CHANGE UP (ref 3.5–5.3)
POTASSIUM SERPL-SCNC: 3.6 MMOL/L — SIGNIFICANT CHANGE UP (ref 3.5–5.3)
RBC # BLD: 4.59 M/UL — SIGNIFICANT CHANGE UP (ref 4.2–5.8)
RBC # BLD: 4.62 M/UL — SIGNIFICANT CHANGE UP (ref 4.2–5.8)
RBC # FLD: 12 % — SIGNIFICANT CHANGE UP (ref 10.3–14.5)
RBC # FLD: 12.1 % — SIGNIFICANT CHANGE UP (ref 10.3–14.5)
SODIUM SERPL-SCNC: 142 MMOL/L — SIGNIFICANT CHANGE UP (ref 135–145)
WBC # BLD: 6.32 K/UL — SIGNIFICANT CHANGE UP (ref 3.8–10.5)
WBC # BLD: 9.41 K/UL — SIGNIFICANT CHANGE UP (ref 3.8–10.5)
WBC # FLD AUTO: 6.32 K/UL — SIGNIFICANT CHANGE UP (ref 3.8–10.5)
WBC # FLD AUTO: 9.41 K/UL — SIGNIFICANT CHANGE UP (ref 3.8–10.5)

## 2023-01-26 PROCEDURE — 36228 PLACE CATH INTRACRANIAL ART: CPT | Mod: LT

## 2023-01-26 PROCEDURE — 70496 CT ANGIOGRAPHY HEAD: CPT | Mod: 26

## 2023-01-26 PROCEDURE — 36224 PLACE CATH CAROTD ART: CPT | Mod: LT

## 2023-01-26 PROCEDURE — 61624 TCAT PERM OCCLS/EMBOLJ CNS: CPT

## 2023-01-26 PROCEDURE — 75894 X-RAYS TRANSCATH THERAPY: CPT | Mod: 26

## 2023-01-26 PROCEDURE — 75898 FOLLOW-UP ANGIOGRAPHY: CPT | Mod: 26

## 2023-01-26 PROCEDURE — 99291 CRITICAL CARE FIRST HOUR: CPT

## 2023-01-26 RX ORDER — POTASSIUM CHLORIDE 20 MEQ
40 PACKET (EA) ORAL ONCE
Refills: 0 | Status: COMPLETED | OUTPATIENT
Start: 2023-01-26 | End: 2023-01-26

## 2023-01-26 RX ORDER — ASPIRIN/CALCIUM CARB/MAGNESIUM 324 MG
81 TABLET ORAL
Refills: 0 | Status: DISCONTINUED | OUTPATIENT
Start: 2023-01-27 | End: 2023-01-27

## 2023-01-26 RX ORDER — LOSARTAN POTASSIUM 100 MG/1
25 TABLET, FILM COATED ORAL DAILY
Refills: 0 | Status: DISCONTINUED | OUTPATIENT
Start: 2023-01-26 | End: 2023-01-27

## 2023-01-26 RX ORDER — INSULIN LISPRO 100/ML
VIAL (ML) SUBCUTANEOUS AT BEDTIME
Refills: 0 | Status: DISCONTINUED | OUTPATIENT
Start: 2023-01-26 | End: 2023-01-27

## 2023-01-26 RX ORDER — MAGNESIUM SULFATE 500 MG/ML
1 VIAL (ML) INJECTION ONCE
Refills: 0 | Status: COMPLETED | OUTPATIENT
Start: 2023-01-26 | End: 2023-01-26

## 2023-01-26 RX ORDER — INSULIN LISPRO 100/ML
VIAL (ML) SUBCUTANEOUS
Refills: 0 | Status: DISCONTINUED | OUTPATIENT
Start: 2023-01-26 | End: 2023-01-27

## 2023-01-26 RX ORDER — SODIUM CHLORIDE 9 MG/ML
1000 INJECTION INTRAMUSCULAR; INTRAVENOUS; SUBCUTANEOUS
Refills: 0 | Status: DISCONTINUED | OUTPATIENT
Start: 2023-01-26 | End: 2023-01-27

## 2023-01-26 RX ORDER — ATORVASTATIN CALCIUM 80 MG/1
40 TABLET, FILM COATED ORAL AT BEDTIME
Refills: 0 | Status: DISCONTINUED | OUTPATIENT
Start: 2023-01-26 | End: 2023-01-27

## 2023-01-26 RX ORDER — ALBUTEROL 90 UG/1
2 AEROSOL, METERED ORAL EVERY 6 HOURS
Refills: 0 | Status: DISCONTINUED | OUTPATIENT
Start: 2023-01-26 | End: 2023-01-27

## 2023-01-26 RX ORDER — TICAGRELOR 90 MG/1
90 TABLET ORAL
Refills: 0 | Status: DISCONTINUED | OUTPATIENT
Start: 2023-01-26 | End: 2023-01-27

## 2023-01-26 RX ORDER — ACETAMINOPHEN 500 MG
650 TABLET ORAL EVERY 6 HOURS
Refills: 0 | Status: DISCONTINUED | OUTPATIENT
Start: 2023-01-26 | End: 2023-01-27

## 2023-01-26 RX ADMIN — TICAGRELOR 90 MILLIGRAM(S): 90 TABLET ORAL at 17:43

## 2023-01-26 RX ADMIN — Medication 100 GRAM(S): at 16:44

## 2023-01-26 RX ADMIN — ATORVASTATIN CALCIUM 40 MILLIGRAM(S): 80 TABLET, FILM COATED ORAL at 22:40

## 2023-01-26 RX ADMIN — SODIUM CHLORIDE 70 MILLILITER(S): 9 INJECTION INTRAMUSCULAR; INTRAVENOUS; SUBCUTANEOUS at 15:07

## 2023-01-26 RX ADMIN — Medication 40 MILLIEQUIVALENT(S): at 16:45

## 2023-01-26 NOTE — CHART NOTE - NSCHARTNOTEFT_GEN_A_CORE
CAPRINI SCORE [CLOT] Score on Admission for     AGE RELATED RISK FACTORS                                                       MOBILITY RELATED FACTORS  [x ] Age 41-60 years                                            (1 Point)                  [ ] Bed rest                                                        (1 Point)  [ ] Age: 61-74 years                                           (2 Points)                 [ ] Plaster cast                                                   (2 Points)  [ ] Age= 75 years                                              (3 Points)                 [ ] Bed bound for more than 72 hours                 (2 Points)    DISEASE RELATED RISK FACTORS                                               GENDER SPECIFIC FACTORS  [ ] Edema in the lower extremities                       (1 Point)                  [ ] Pregnancy                                                     (1 Point)  [ ] Varicose veins                                               (1 Point)                  [ ] Post-partum < 6 weeks                                   (1 Point)             [ x] BMI > 25 Kg/m2                                            (1 Point)                  [ ] Hormonal therapy  or oral contraception          (1 Point)                 [ ] Sepsis (in the previous month)                        (1 Point)                  [ ] History of pregnancy complications                 (1 point)  [ ] Pneumonia or serious lung disease                                               [ ] Unexplained or recurrent                     (1 Point)           (in the previous month)                               (1 Point)  [ ] Abnormal pulmonary function test                     (1 Point)                 SURGERY RELATED RISK FACTORS (include planned surgeries)  [ ] Acute myocardial infarction                              (1 Point)                 [ ]  Section                                             (1 Point)  [ ] Congestive heart failure (in the previous month)  (1 Point)               [ ] Minor surgery                                                  (1 Point)   [ ] Inflammatory bowel disease                             (1 Point)                 [ ] Arthroscopic surgery                                        (2 Points)  [ ] Central venous access                                      (2 Points)                [ ] General surgery lasting more than 45 minutes   (2 Points)       [ ] Stroke (in the previous month)                          (5 Points)               [ ] Elective arthroplasty                                         (5 Points)            [ ] Current or past malignancy                                (2 Points)                                                                                                     HEMATOLOGY RELATED FACTORS                                                 TRAUMA RELATED RISK FACTORS  [ ] Prior episodes of VTE                                     (3 Points)                [ ] Fracture of the hip, pelvis, or leg                       (5 Points)  [ ] Positive family history for VTE                         (3 Points)                 [ ] Acute spinal cord injury (in the previous month)  (5 Points)  [ ] Prothrombin 71367 A                                     (3 Points)                 [ ] Paralysis  (less than 1 month)                             (5 Points)  [ ] Factor V Leiden                                             (3 Points)                  [ ] Multiple Trauma within 1 month                        (5 Points)  [ ] Lupus anticoagulants                                     (3 Points)                                                           [ ] Anticardiolipin antibodies                               (3 Points)                                                       [ ] High homocysteine in the blood                      (3 Points)                                             [ ] Other congenital or acquired thrombophilia      (3 Points)                                                [ ] Heparin induced thrombocytopenia                  (3 Points)                                          Total Score [   2       ]    Risk:  Very low 0   Low 1 to 2   Moderate 3 to 4   High =5       VTE Prophylasix Recommednations:  [ x] mechanical pneumatic compression devices                                      [ ] contraindicated: _____________________  [ ] chemo prophylasix                                                                                   [ ] contraindicated _____________________    **** HIGH LIKELIHOOD DVT PRESENT ON ADMISSION  [ ] (please order LE dopplers within 24 hours of admission)

## 2023-01-26 NOTE — PATIENT PROFILE ADULT - PRO INTERPRETER NEED 2
comfortable appearance/cooperative comfortable appearance/cooperative comfortable appearance/cooperative English

## 2023-01-26 NOTE — CHART NOTE - NSCHARTNOTEFT_GEN_A_CORE
Interventional Neuro Radiology  Pre-Procedure Note PA-C      This is a 52 year old right hand dominant male with a past medical history significant for HTN, HLD, DM2 on metformin, asthma (intubated  to exacerbation, after that controlled with albuterol inhaler as needed ), covid 19 mild 2022 and anxiety. Positive family history of ruptured cerebral aneurysm, father passed 2022, brother with cavernous aneurysm, who is status post  diagnostic cerebral angiogram 10/27/22 demonstrating 5.6mm left ophthalmic aneurysm. patient presents to Neuro IR accompanied by his brother for a catheter cerebral angiogram and embolization of aneurysm.     Upon exam patient is PERRL, EOMI, Tongue is midline, A + O x 3, speech is fluent, recent and remote memory intact, follows commands, moves all extremities, sensation intact, motor 5/5, ambulates without assist.     Allergies: No Known Allergies  PMHX: left ophthalmic artery aneurysm, DM 2, hypertension, hyperlipidemia, Asthma, Anxiety, novel coronavirus disease (COVID-19)  PSHX: 10-27-22 Dx cerebral angiogram colonoscopy  Social History: Quit tobacco smoking   FAMILY HISTORY: + father  from a cerebral aneurysm, + brother   Current Medications:   Covid: not detected   Labs:                         16.7   6.52  )-----------( 220      ( 2023 11:31 )             48.7           137  |  100  |  13  ----------------------------<  117<H>  3.3<L>   |  23  |  0.80    Ca    10.2      2023 11:31      Blood Bank: 0 positive       Assessment/Plan:   This is a 52 year old right hand dominant male with a left ophthalmic artery aneurysm, who presents to Neuro IR for a catheter cerebral angiogram and embolization of aneurysm. Procedure, goals, risks, benefits and alternatives were discussed with patient and patient's family. All questions were answered. Risks include but are not limited to stroke, vessel injury, hemorrhage, and or right  groin hematoma. Patient demonstrates understanding of all risks involved with this procedure and wishes to continue.  Appropriate  consent was obtained from patient and consent is in the patient's chart.

## 2023-01-26 NOTE — PATIENT PROFILE ADULT - FALL HARM RISK - UNIVERSAL INTERVENTIONS
DISPLAY PLAN FREE TEXT Bed in lowest position, wheels locked, appropriate side rails in place/Call bell, personal items and telephone in reach/Instruct patient to call for assistance before getting out of bed or chair/Non-slip footwear when patient is out of bed/[place] to call system/Physically safe environment - no spills, clutter or unnecessary equipment/Purposeful Proactive Rounding/Room/bathroom lighting operational, light cord in reach

## 2023-01-26 NOTE — PROGRESS NOTE ADULT - SUBJECTIVE AND OBJECTIVE BOX
SUMMARY: 51 y/o M with PMHx of HTN, HLD, DM2 on metformin, asthma (intubated 1994 2/2 to exacerbation, after that controlled with albuterol inhaler as needed ), covid 19 mild 9/2022 and anxiety. 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 blount     [facility] COURSE: Patient s/p L opthalmic aneurysm pipeline stenting x2.    24 HOUR EVENTS: Post op patient stable, no gilles-op complications    ADMISSION SCORES:   GCS: 15     REVIEW OF SYSTEMS: REVIEW OF SYSTEMS: [] Unable to Assess due to neurologic exam   [x ] All ROS addressed below are non-contributory, except:  Neuro: [ ] Headache [ ] Back pain [ ] Numbness [ ] Weakness [ ] Ataxia [ ] Dizziness [ ] Aphasia [ ] Dysarthria [ ] Visual disturbance  Resp: [ ] Shortness of breath/dyspnea [ ] Orthopnea [ ] Cough  CV: [ ] Chest pain [ ] Palpitation [ ] Lightheadedness [ ] Syncope  Renal: [ ] Thirst [ ] Edema  GI: [ ] Nausea [ ] Emesis [ ] Abdominal pain [ ] Constipation [ ] Diarrhea  Hem: [ ] Hematemesis [ ] bBright red blood per rectum  ID: [ ] Fever [ ] Chills [ ] Dysuria  ENT: [ ] Rhinorrhea    ALLERGIES: Allergies    No Known Allergies    Intolerances        VITALS/DATA/ORDERS: [] Reviewed    DEVICES:   [] Restraints [x] PIVs [] ET tube [] central line [] PICC [x] arterial line [x] trammell [] NGT/OGT [] EVD [] LD [] CHARLEY/HMV [] LiCOX [] ICP monitor [] Trach [] PEG [] Chest Tube [] other:    EXAMINATION:  General: No acute distress  HEENT: Anicteric sclerae  Cardiac: [id number]  Lungs: Clear  Abdomen: Soft, non-tender, +BS  Extremities: No c/c/e  Skin/Incision Site: Clean, dry and intact  Neurologic: Awake, alert, fully oriented, follows commands, PERRL, VFFtc, EOMI, face symmetric, tongue midline, no drift, full strength. RLE in safeguard but strong distally.  Groin clean, non bloody, nontender, pedal pulses strong bilaterally SUMMARY: 51 y/o M with PMHx of HTN, HLD, DM2 on metformin, asthma (intubated 1994 2/2 to exacerbation, after that controlled with albuterol inhaler as needed ), covid 19 mild 9/2022 and anxiety. 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 blount     [facility] COURSE: Patient s/p L opthalmic aneurysm pipeline stenting x2.    24 HOUR EVENTS: Post op patient stable, no gilles-op complications    ADMISSION SCORES:   GCS: 15     REVIEW OF SYSTEMS: REVIEW OF SYSTEMS: [] Unable to Assess due to neurologic exam   [x ] All ROS addressed below are non-contributory, except:  Neuro: [ ] Headache [ ] Back pain [ ] Numbness [ ] Weakness [ ] Ataxia [ ] Dizziness [ ] Aphasia [ ] Dysarthria [ ] Visual disturbance  Resp: [ ] Shortness of breath/dyspnea [ ] Orthopnea [ ] Cough  CV: [ ] Chest pain [ ] Palpitation [ ] Lightheadedness [ ] Syncope  Renal: [ ] Thirst [ ] Edema  GI: [ ] Nausea [ ] Emesis [ ] Abdominal pain [ ] Constipation [ ] Diarrhea  Hem: [ ] Hematemesis [ ] bBright red blood per rectum  ID: [ ] Fever [ ] Chills [ ] Dysuria  ENT: [ ] Rhinorrhea    ALLERGIES: Allergies    No Known Allergies    Intolerances        T(C): 36.4 (01-26-23 @ 13:27), Max: 36.6 (01-26-23 @ 08:51)  HR: 98 (01-26-23 @ 14:15) (90 - 106)  BP: 116/66 (01-26-23 @ 14:15) (106/63 - 153/91)  RR: 18 (01-26-23 @ 14:15) (15 - 18)  SpO2: 98% (01-26-23 @ 14:15) (93% - 98%)  01-26-23 @ 07:01  -  01-26-23 @ 14:48  --------------------------------------------------------  IN: 210 mL / OUT: 150 mL / NET: 60 mL    acetaminophen     Tablet .. 650 milliGRAM(s) Oral every 6 hours PRN  albuterol    90 MICROgram(s) HFA Inhaler 2 Puff(s) Inhalation every 6 hours PRN  atorvastatin 40 milliGRAM(s) Oral at bedtime  insulin lispro (ADMELOG) corrective regimen sliding scale   SubCutaneous three times a day before meals  insulin lispro (ADMELOG) corrective regimen sliding scale   SubCutaneous at bedtime  losartan 25 milliGRAM(s) Oral daily  sodium chloride 0.9%. 1000 milliLiter(s) IV Continuous <Continuous>  ticagrelor 90 milliGRAM(s) Oral <User Schedule>      DEVICES:   [] Restraints [x] PIVs [] ET tube [] central line [] PICC [x] arterial line [x] trammell [] NGT/OGT [] EVD [] LD [] CHARLEY/HMV [] LiCOX [] ICP monitor [] Trach [] PEG [] Chest Tube [] other:    EXAMINATION:  General: No acute distress  HEENT: Anicteric sclerae  Cardiac: [id number]  Lungs: Clear  Abdomen: Soft, non-tender, +BS  Extremities: No c/c/e  Skin/Incision Site: Clean, dry and intact. no groin hematoma . nl LE dopplers   Neurologic: Awake, alert, fully oriented, follows commands, PERRL, VFFtc, EOMI, face symmetric, tongue midline, no drift, full strength. RLE in safeguard but strong distally.  Groin clean, non bloody, nontender, pedal pulses strong bilaterally      LABS:  Na: 137 (01-24 @ 11:31)  K: 3.3 (01-24 @ 11:31)  Cl: 100 (01-24 @ 11:31)  CO2: 23 (01-24 @ 11:31)  BUN: 13 (01-24 @ 11:31)  Cr: 0.80 (01-24 @ 11:31)  Glu: 117(01-24 @ 11:31)    Hgb: 14.1 (01-26 @ 14:29), 16.7 (01-24 @ 11:31)  Hct: 41.6 (01-26 @ 14:29), 48.7 (01-24 @ 11:31)  WBC: 6.32 (01-26 @ 14:29), 6.52 (01-24 @ 11:31)  Plt: 164 (01-26 @ 14:29), 220 (01-24 @ 11:31)    INR:   PTT:

## 2023-01-26 NOTE — CHART NOTE - NSCHARTNOTEFT_GEN_A_CORE
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   Post- Procedure Diagnosis:    : Dr Anderson Stewart   Physician Assistant: Halley Gauthier PA-C    Nurse:                   Nan Sparks RN   Radiologic Tech:   Arvind Wheeler LRT  Anesthesiologist:  Dr Bart Mcfarlane   Sheath:      I/Os: EBL less than 10cc  IV fluids:     cc  Urine output     cc    Contrast Omnipaque 240              Vitals: BP         HR      Spo2 100%          Preliminary Report:  Using a  Palestinian long sheath to the right groin under general sedation via left vertebral artery,  left internal carotid artery, left external carotid artery, right vertebral artery, right internal carotid artery, right external carotid artery a selective cerebral angiography was performed and demonstrated                       Official note to follow.  Patient tolerated procedure well, hemodynamically stable, no change in neurological status compared to baseline. Results discussed with neuro ICU team, patient and patient's brother. Right groin sheath was removed, manual compression held to hemostasis for 20 minutes, no active bleeding, no hematoma, quick clot and safeguard balloon dressing applied at Interventional Neuro- Radiology   Procedure Note PA-C    Procedure: Catheter Cerebral Angiogram and endovascular treatment of a left ophthalmic artery aneurysm    Pre- Procedure Diagnosis:  Left ophthalmic artery aneurysm   Post- Procedure Diagnosis:    : Dr Anderson Stewart   Physician Assistant: Halley Gauthier PA-C    Nurse:                   Nan Sparks RN   Radiologic Tech:   Arvind Wheeler LRT  Anesthesiologist:  Dr Bart Mcfarlane   Sheath:                 6 Moldovan BMX 81   ACT:147   Heparin 5,ooo units                 Milrinone 3mg let ICA    I/Os: EBL less than 10cc  IV fluids:     cc  Urine      cc  Contrast Omnipaque 240            Pipeline FLEX Shield  4.50mm by 14mm   and a Pipeline Flex shield 4.75mm by 12mm  Vitals: /60        HR 93     Spo2 100%          Preliminary Report:  Using a 6 Moldovan BMX sheath to the right groin under general sedation via left internal carotid artery a selective cerebral angiography was performed and again demonstrated a left ophthalmic artery aneurysm. patient underwent successful endovascular treatment of aneurysm, with 2 pipeline FLEX shield stents. Official note to follow.  Patient tolerated procedure well, hemodynamically stable, no change in neurological status compared to baseline. Results discussed with neuro ICU team, patient and patient's brother.Bart. Right groin sheath was removed, manual compression held to hemostasis for 20 minutes, no active bleeding, no hematoma, quick clot and safeguard balloon dressing applied at Interventional Neuro- Radiology   Procedure Note PA-C    Procedure: Catheter Cerebral Angiogram and endovascular treatment of a left ophthalmic artery aneurysm    Pre- Procedure Diagnosis:  Left ophthalmic artery aneurysm   Post- Procedure Diagnosis:    : Dr Anderson Proctor   Physician Assistant: Halley Gauthier PA-C    Nurse:                   Nan Sparks RN   Radiologic Tech:   Arvind Wheeler LRT  Anesthesiologist:  Dr Bart Mcfarlane   Sheath:                 6 Sammarinese BMX 81   ACT:147   Heparin 5,ooo units                 Milrinone 3mg let ICA    I/Os: EBL less than 10cc  IV fluids:     cc  Urine      cc  Contrast Omnipaque 240            Pipeline FLEX Shield  4.50mm by 14mm   and a Pipeline Flex shield 4.75mm by 12mm  Vitals: /60        HR 93     Spo2 100%          Preliminary Report:  Using a 6 Sammarinese BMX sheath to the right groin under general sedation via left internal carotid artery a selective cerebral angiography was performed and again demonstrated a left ophthalmic artery aneurysm. patient underwent successful endovascular treatment of aneurysm, with 2 pipeline FLEX shield stents. Official note to follow.  Patient tolerated procedure well, hemodynamically stable, no change in neurological status compared to baseline. Results discussed with neuro ICU team, patient and patient's brother, Bart. Right groin sheath was removed, CELT manual compression held to hemostasis for 20 minutes, by Dr Proctor. No active bleeding, no hematoma, quick clot and safeguard balloon dressing applied at 1315 hours. Interventional Neuro- Radiology   Procedure Note PA-C    Procedure: Catheter Cerebral Angiogram and endovascular treatment of a left ophthalmic artery aneurysm    Pre- Procedure Diagnosis:  Left ophthalmic artery aneurysm   Post- Procedure Diagnosis:    : Dr Anderson Stewart                      Fellow: Dr Claude Proctor   Physician Assistant: Halley Gauthier PA-C    Nurse:                   Nan Sparks RN   Radiologic Tech:   Arvind Wheeler LRT  Anesthesiologist:  Dr Bart Mcfarlane   Sheath:                 6 Romanian BMX 81   ACT:147   Heparin 5,ooo units      Milrinone 3mg let ICA    I/Os: EBL less than 10cc  IV fluids: 300cc  Urine      cc  Contrast Omnipaque 240            Pipeline FLEX Shield  4.50mm by 14mm   and a Pipeline Flex shield 4.75mm by 12mm  Vitals: /60        HR 93     Spo2 100%          Preliminary Report:  Using a 6 Romanian BMX sheath to the right groin under general sedation via left internal carotid artery a selective cerebral angiography was performed and again demonstrated a left ophthalmic artery aneurysm. patient underwent successful endovascular treatment of aneurysm, with 2 pipeline FLEX shield stents. Official note to follow.  Patient tolerated procedure well, hemodynamically stable, no change in neurological status compared to baseline. Results discussed with neuro ICU team, patient and patient's brother, Bart. Right groin sheath was removed, CELT manual compression held to hemostasis for 20 minutes, by Dr Proctor. No active bleeding, no hematoma, quick clot and safeguard balloon dressing applied at 1315 hours. Interventional Neuro- Radiology   Procedure Note PA-C    Procedure: Catheter Cerebral Angiogram and endovascular treatment of a left ophthalmic artery aneurysm    Pre- Procedure Diagnosis:  Left ophthalmic artery aneurysm   Post- Procedure Diagnosis:    : Dr Anderson Stewart                       Fellow: Dr Claude Proctor   Physician Assistant: Halley Gauthier PA-C    Nurse:                   Nan Sparks RN   Radiologic Tech:   Arvind Wheeler LRT  Anesthesiologist:  Dr Bart Mcfarlane   Sheath:                 6 Belgian BMX 81   ACT:147   Heparin 5,ooo units      Milrinone 3mg let ICA    I/Os: EBL less than 10cc  IV fluids: 300cc  Urine 300cc  Contrast Omnipaque 240  63cc          Pipeline FLEX Shield  4.50mm by 14mm   and a Pipeline Flex shield 4.75mm by 12mm  Vitals: /60        HR 93     Spo2 100%          Preliminary Report:  Using a 6 Belgian BMX sheath to the right groin under general sedation via left internal carotid artery a selective cerebral angiography was performed and again demonstrated a left ophthalmic artery aneurysm. patient underwent successful endovascular treatment of aneurysm, with 2 pipeline FLEX shield stents. Official note to follow.  Patient tolerated procedure well, hemodynamically stable, no change in neurological status compared to baseline. Results discussed with neuro ICU team, patient and patient's brother, Bart. Right groin sheath was removed, CELT manual compression held to hemostasis for 20 minutes, by Dr Proctor. No active bleeding, no hematoma, quick clot and safeguard balloon dressing applied at 1315 hours. Interventional Neuro- Radiology   Procedure Note PA-C    Procedure: Catheter Cerebral Angiogram and endovascular treatment of a left ophthalmic artery aneurysm    Pre- Procedure Diagnosis:  Left ophthalmic artery aneurysm   Post- Procedure Diagnosis:    : Dr Anderson Stewart                         Fellow: Dr Claude Proctor   Physician Assistant: Halley Gauthier PA-C    Nurse:                   Nan Sparks RN   Radiologic Tech:   Arvind Wheeler LRT  Anesthesiologist:  Dr Bart Mcfarlane   Sheath:                 6 Faroese BMX 81   ACT:147   Heparin 5,ooo units      Milrinone 3mg let ICA    I/Os: EBL less than 10cc  IV fluids: 300cc  Urine 300cc  Contrast Omnipaque 240  63cc          Pipeline FLEX Shield  4.50mm by 14mm   and a Pipeline Flex shield 4.75mm by 12mm  Vitals: /60        HR 93     Spo2 100%          Preliminary Report:  Using a 6 Faroese BMX sheath to the right groin under general sedation via left internal carotid artery a selective cerebral angiography was performed and again demonstrated a left ophthalmic artery aneurysm. patient underwent successful endovascular treatment of aneurysm, with 2 pipeline FLEX shield stents. Official note to follow.  Patient tolerated procedure well, hemodynamically stable, no change in neurological status compared to baseline. Results discussed with neuro ICU team, patient and patient's brother, Bart. Right groin sheath was removed, CELT manual compression held to hemostasis for 20 minutes, by Dr Proctor. No active bleeding, no hematoma, quick clot and safeguard balloon dressing applied at 1315 hours. Interventional Neuro- Radiology   Procedure Note PA-C    Procedure: Catheter Cerebral Angiogram and endovascular treatment of a left ophthalmic artery aneurysm    Pre- Procedure Diagnosis:  Left ophthalmic artery aneurysm   Post- Procedure Diagnosis:    : Dr Anderson Stewart                          Fellow: Dr Claude Proctor   Physician Assistant: Halley Gauthier PA-C    Nurse:                   Nan Sparks RN   Radiologic Tech:   Arvind Wheeler LRT  Anesthesiologist:  Dr Bart Mcfarlane   Sheath:                 6 Japanese BMX 81   ACT:147   Heparin 5,ooo units      Milrinone 3mg let ICA    I/Os: EBL less than 10cc  IV fluids: 300cc  Urine 500cc  Contrast Omnipaque 240  63cc          Pipeline FLEX Shield  4.50mm by 14mm   and a Pipeline Flex shield 4.75mm by 12mm  Vitals: /60        HR 93     Spo2 100%          Preliminary Report:  Using a 6 Japanese BMX sheath to the right groin under general sedation via left internal carotid artery a selective cerebral angiography was performed and again demonstrated a left ophthalmic artery aneurysm. patient underwent successful endovascular treatment of aneurysm, with 2 pipeline FLEX shield stents. Official note to follow.  Patient tolerated procedure well, hemodynamically stable, no change in neurological status compared to baseline. Results discussed with neuro ICU team, patient and patient's brother, Bart. Right groin sheath was removed, CELT manual compression held to hemostasis for 20 minutes, by Dr Proctor. No active bleeding, no hematoma, quick clot and safeguard balloon dressing applied at 1300hours. Disposition PACU. MRI/MRA NOVA. ASA 81mg daily and Brilinta 90 mg every 12 hours. Interventional Neuro- Radiology   Procedure Note PA-C    Procedure: Catheter Cerebral Angiogram and endovascular treatment of a left ophthalmic artery aneurysm    Pre- Procedure Diagnosis:  Left ophthalmic artery aneurysm   Post- Procedure Diagnosis:    : Dr Anderson Stewart                          Fellow: Dr Claude Proctor   Physician Assistant: Halley Gauthier PA-C    Nurse:                   Nan Sparks RN   Radiologic Tech:   Arvind Wheeler LRT  Anesthesiologist:  Dr Bart Mcfarlane   Sheath:                 6 Bulgarian BMX 81     ACT:147   Heparin 5,ooo units          Milrinone 3mg let ICA                 I/Os: EBL less than 10cc  IV fluids: 300cc  Urine 500cc  Contrast Omnipaque 240  63cc          Pipeline FLEX Shield  4.50mm by 14mm   and a Pipeline Flex shield 4.75mm by 12mm    Vitals: /60        HR 93     Spo2 100%          Preliminary Report:  Using a 6 Bulgarian BMX sheath to the right groin under general sedation via left internal carotid artery a selective cerebral angiography was performed and again demonstrated a left ophthalmic artery aneurysm. patient underwent successful endovascular treatment of aneurysm, with 2 pipeline FLEX shield stents. Official note to follow.  Patient tolerated procedure well, hemodynamically stable, no change in neurological status compared to baseline. Results discussed with neuro ICU team, patient and patient's brother, Bart. Right groin sheath was removed, CELT manual compression held to hemostasis for 20 minutes, by Dr Proctor. No active bleeding, no hematoma, quick clot and safeguard balloon dressing applied at 1300hours. Disposition PACU. MRI/MRA NOVA. ASA 81mg daily and Brilinta 90 mg every 12 hours. Systolic blood pressure 100 to 140. Dr Stewart spoke with patient's brother (Bart) after procedure. Interventional Neuro- Radiology   Procedure Note PA-C    Procedure: Catheter Cerebral Angiogram and endovascular treatment of a left ophthalmic artery aneurysm    Pre- Procedure Diagnosis:  Left ophthalmic artery aneurysm   Post- Procedure Diagnosis:    : Dr Anderson Stewart                            Fellow: Dr Claude Proctor   Physician Assistant: Halley Gauthier PA-C    Nurse:                   Nan Sparks RN   Radiologic Tech:   Arvind Wheeler LRT  Anesthesiologist:  Dr Bart Mcfarlane   Sheath:                 6 German BMX 81     ACT:147   Heparin 5,ooo units          Milrinone 3mg let ICA                 I/Os: EBL less than 10cc  IV fluids: 300cc  Urine 500cc  Contrast Omnipaque 240  63cc          Pipeline FLEX Shield  4.50mm by 14mm   and a Pipeline Flex shield 4.75mm by 12mm    Vitals: /60        HR 93     Spo2 100%          Preliminary Report:  Using a 6 German BMX sheath to the right groin under general sedation via left internal carotid artery a selective cerebral angiography was performed and again demonstrated a left ophthalmic artery aneurysm. patient underwent successful endovascular treatment of aneurysm, with 2 pipeline FLEX shield stents. Official note to follow.  Patient tolerated procedure well, hemodynamically stable, no change in neurological status compared to baseline. Results discussed with neuro ICU team, patient and patient's brother, Bart. Right groin sheath was removed, CELT manual compression held to hemostasis for 20 minutes, by Dr Proctor. No active bleeding, no hematoma, quick clot and safeguard balloon dressing applied at 1300hours. Disposition PACU. MRI/MRA NOVA. ASA 81mg daily and Brilinta 90 mg every 12 hours. Systolic blood pressure 100 to 140. Dr Stewart spoke with patient's brother (Bart) after procedure. Interventional Neuro- Radiology   Procedure Note PA-C    Procedure: Catheter Cerebral Angiogram and endovascular treatment of a left ophthalmic artery aneurysm    Pre- Procedure Diagnosis:  Left ophthalmic artery aneurysm   Post- Procedure Diagnosis:    : Dr Anderson Stewart                            Fellow: Dr Claude Proctor   Physician Assistant: Halley Gauthier PA-C  Resident:                Dr Mattie Payne    Nurse:                   Nan Sparks RN   Radiologic Tech:   Arvind Wheeler LRT  Anesthesiologist:  Dr Bart Mcfarlane   Sheath:                 6 Gibraltarian BMX 81     ACT:147   Heparin 5,ooo units          Milrinone 3mg let ICA                 I/Os: EBL less than 10cc  IV fluids: 300cc  Urine 500cc  Contrast Omnipaque 240  63cc          Pipeline FLEX Shield  4.50mm by 14mm   and a Pipeline Flex shield 4.75mm by 12mm    Vitals: /60        HR 93     Spo2 100%          Preliminary Report:  Using a 6 Gibraltarian BMX sheath to the right groin under general sedation via left internal carotid artery a selective cerebral angiography was performed and again demonstrated a left ophthalmic artery aneurysm. patient underwent successful endovascular treatment of aneurysm, with 2 pipeline FLEX shield stents. Official note to follow.  Patient tolerated procedure well, hemodynamically stable, no change in neurological status compared to baseline. Results discussed with neuro ICU team, patient and patient's brother, Bart. Right groin sheath was removed, CELT manual compression held to hemostasis for 20 minutes, by Dr Proctor. No active bleeding, no hematoma, quick clot and safeguard balloon dressing applied at 1300hours. Disposition PACU. MRI/MRA NOVA. ASA 81mg daily and Brilinta 90 mg every 12 hours. Systolic blood pressure 100 to 140. Dr Stewart spoke with patient's brother (Bart) after procedure. Interventional Neuro- Radiology   Procedure Note PA-C    Procedure: Catheter Cerebral Angiogram and endovascular treatment of a left ophthalmic artery aneurysm    Pre- Procedure Diagnosis:  Left ophthalmic artery aneurysm   Post- Procedure Diagnosis:    : Dr Anderson Stewart                            Fellow: Dr Claude Proctor   Physician Assistant: Halley Gauthier PA-C  Resident:                 Dr Mattie Payne    Nurse:                   Nan Sparks RN   Radiologic Tech:   Arvind Wheeler LRT  Anesthesiologist:  Dr Bart Mcfarlane   Sheath:                 6 Sinhala BMX 81     ACT:147   Heparin 5,ooo units          Milrinone 3mg let ICA                 I/Os: EBL less than 10cc  IV fluids: 300cc  Urine 500cc  Contrast Omnipaque 240  63cc          Pipeline FLEX Shield  4.50mm by 14mm   and a Pipeline Flex shield 4.75mm by 12mm    Vitals: /60        HR 93     Spo2 100%          Preliminary Report:  Using a 6 Sinhala BMX sheath to the right groin under general sedation via left internal carotid artery a selective cerebral angiography was performed and again demonstrated a left ophthalmic artery aneurysm. patient underwent successful endovascular treatment of aneurysm, with 2 pipeline FLEX shield stents. Official note to follow.  Patient tolerated procedure well, hemodynamically stable, no change in neurological status compared to baseline. Results discussed with neuro ICU team, patient and patient's brother, Bart. Right groin sheath was removed, CELT manual compression held to hemostasis for 20 minutes, by Dr Proctor. No active bleeding, no hematoma, quick clot and safeguard balloon dressing applied at 1300hours. Disposition PACU. MRI/MRA NOVA. ASA 81mg daily and Brilinta 90 mg every 12 hours. Systolic blood pressure 100 to 140. Dr Stewart spoke with patient's brother (Bart) after procedure.

## 2023-01-26 NOTE — PROGRESS NOTE ADULT - ASSESSMENT
ASSESSMENT/PLAN: 53 y/o male with history of DM, DL, HTN, asthma presents for L opthalmic aneurysm stenting, elective procedure. POD 0    NEURO:  Neurochecks Q1H  Will continue on ASA, brilinta daily  Will obtain MRI, MR Hall tomorrow morning  Pain control  Activity: [] mobilize as tolerated [x] Bedrest x 4 hours post angio [] PT [] OT [] PMNR    PULM:  NC, wean to room air  Restart home inhalers as needed    CV:  History of hypertension  SBP goal 100-140 mmhg  Restart home losartan 25 mg QD  C/W home atorvastatin     RENAL:  Fluids: IVF until tolerating diet                                                                                                   GI:  Diet: NPO until dysphagia eval  GI prophylaxis [x] not indicated [] PPI [] other:  Bowel regimen [x] miralax [x] senna [] other:    ENDO:   History of DM II  ISS   hold home metformin  Goal euglycemia (-180)    HEME/ONC:  VTE prophylaxis: [x] SCDs [] chemoprophylaxis [x] hold chemoprophylaxis due to: fresh post op day0 [] high risk of DVT/PE on admission due to:    ID:  Afebrile  Post op labs pending     MISC:    SOCIAL/FAMILY:  [x] awaiting [] updated at bedside [] family meeting    CODE STATUS:  [x] Full Code [] DNR [] DNI [] Palliative/Comfort Care    DISPOSITION:  [x] ICU [] Stroke Unit [] Floor [] EMU [] RCU [] PCU    [x] Patient is at high risk of neurologic deterioration/death due to: aneurysmal rupture

## 2023-01-26 NOTE — ASU PREOP CHECKLIST - NS PREOP CHK TEST_COVID RESULT_GEN_ALL_CORE
Negative
Appearance: Dressed appropriately. Mildly unkempt.   Behavior: Cooperative.  Fair eye contact.   Motor: No abnormal movements, no psychomotor slowing or activation.  Speech: Regular rate.  Mood: "Fine."  Affect: Frustrated.   Thought Process: Linear at times overinclusive.  Associations: Fair.  Thought Content: Ruminations. Denies AVH.  Denies SIIIP or HIIP.  Insight: Limited.  Judgment: Limited.   Attention: Fair.  Language: Fluent.  Gait: Intact.

## 2023-01-27 ENCOUNTER — TRANSCRIPTION ENCOUNTER (OUTPATIENT)
Age: 53
End: 2023-01-27

## 2023-01-27 VITALS
DIASTOLIC BLOOD PRESSURE: 76 MMHG | HEART RATE: 82 BPM | TEMPERATURE: 98 F | SYSTOLIC BLOOD PRESSURE: 147 MMHG | RESPIRATION RATE: 18 BRPM | OXYGEN SATURATION: 99 %

## 2023-01-27 LAB
ANION GAP SERPL CALC-SCNC: 11 MMOL/L — SIGNIFICANT CHANGE UP (ref 5–17)
BUN SERPL-MCNC: 12 MG/DL — SIGNIFICANT CHANGE UP (ref 7–23)
CALCIUM SERPL-MCNC: 8.9 MG/DL — SIGNIFICANT CHANGE UP (ref 8.4–10.5)
CHLORIDE SERPL-SCNC: 107 MMOL/L — SIGNIFICANT CHANGE UP (ref 96–108)
CO2 SERPL-SCNC: 22 MMOL/L — SIGNIFICANT CHANGE UP (ref 22–31)
CREAT SERPL-MCNC: 0.74 MG/DL — SIGNIFICANT CHANGE UP (ref 0.5–1.3)
EGFR: 110 ML/MIN/1.73M2 — SIGNIFICANT CHANGE UP
GLUCOSE BLDC GLUCOMTR-MCNC: 104 MG/DL — HIGH (ref 70–99)
GLUCOSE BLDC GLUCOMTR-MCNC: 108 MG/DL — HIGH (ref 70–99)
GLUCOSE SERPL-MCNC: 142 MG/DL — HIGH (ref 70–99)
MAGNESIUM SERPL-MCNC: 2.2 MG/DL — SIGNIFICANT CHANGE UP (ref 1.6–2.6)
PHOSPHATE SERPL-MCNC: 2.7 MG/DL — SIGNIFICANT CHANGE UP (ref 2.5–4.5)
POTASSIUM SERPL-MCNC: 4 MMOL/L — SIGNIFICANT CHANGE UP (ref 3.5–5.3)
POTASSIUM SERPL-SCNC: 4 MMOL/L — SIGNIFICANT CHANGE UP (ref 3.5–5.3)
SODIUM SERPL-SCNC: 140 MMOL/L — SIGNIFICANT CHANGE UP (ref 135–145)

## 2023-01-27 PROCEDURE — 97165 OT EVAL LOW COMPLEX 30 MIN: CPT

## 2023-01-27 PROCEDURE — C1887: CPT

## 2023-01-27 PROCEDURE — 82962 GLUCOSE BLOOD TEST: CPT

## 2023-01-27 PROCEDURE — C1889: CPT

## 2023-01-27 PROCEDURE — 99233 SBSQ HOSP IP/OBS HIGH 50: CPT

## 2023-01-27 PROCEDURE — 70544 MR ANGIOGRAPHY HEAD W/O DYE: CPT

## 2023-01-27 PROCEDURE — 75898 FOLLOW-UP ANGIOGRAPHY: CPT

## 2023-01-27 PROCEDURE — 76380 CAT SCAN FOLLOW-UP STUDY: CPT

## 2023-01-27 PROCEDURE — 70551 MRI BRAIN STEM W/O DYE: CPT

## 2023-01-27 PROCEDURE — 70551 MRI BRAIN STEM W/O DYE: CPT | Mod: 26

## 2023-01-27 PROCEDURE — 61624 TCAT PERM OCCLS/EMBOLJ CNS: CPT

## 2023-01-27 PROCEDURE — 83735 ASSAY OF MAGNESIUM: CPT

## 2023-01-27 PROCEDURE — 80048 BASIC METABOLIC PNL TOTAL CA: CPT

## 2023-01-27 PROCEDURE — 36224 PLACE CATH CAROTD ART: CPT

## 2023-01-27 PROCEDURE — C1769: CPT

## 2023-01-27 PROCEDURE — 85576 BLOOD PLATELET AGGREGATION: CPT

## 2023-01-27 PROCEDURE — C1894: CPT

## 2023-01-27 PROCEDURE — 85027 COMPLETE CBC AUTOMATED: CPT

## 2023-01-27 PROCEDURE — 97161 PT EVAL LOW COMPLEX 20 MIN: CPT

## 2023-01-27 PROCEDURE — 36228 PLACE CATH INTRACRANIAL ART: CPT

## 2023-01-27 PROCEDURE — C1760: CPT

## 2023-01-27 PROCEDURE — C9399: CPT

## 2023-01-27 PROCEDURE — 84100 ASSAY OF PHOSPHORUS: CPT

## 2023-01-27 PROCEDURE — 75894 X-RAYS TRANSCATH THERAPY: CPT

## 2023-01-27 PROCEDURE — 70544 MR ANGIOGRAPHY HEAD W/O DYE: CPT | Mod: 26,XU

## 2023-01-27 RX ORDER — ACETAMINOPHEN 500 MG
2 TABLET ORAL
Qty: 0 | Refills: 0 | DISCHARGE
Start: 2023-01-27

## 2023-01-27 RX ORDER — SODIUM,POTASSIUM PHOSPHATES 278-250MG
1 POWDER IN PACKET (EA) ORAL ONCE
Refills: 0 | Status: COMPLETED | OUTPATIENT
Start: 2023-01-27 | End: 2023-01-27

## 2023-01-27 RX ADMIN — LOSARTAN POTASSIUM 25 MILLIGRAM(S): 100 TABLET, FILM COATED ORAL at 06:08

## 2023-01-27 RX ADMIN — Medication 81 MILLIGRAM(S): at 06:05

## 2023-01-27 RX ADMIN — Medication 1 PACKET(S): at 02:00

## 2023-01-27 RX ADMIN — TICAGRELOR 90 MILLIGRAM(S): 90 TABLET ORAL at 06:05

## 2023-01-27 NOTE — DISCHARGE NOTE PROVIDER - NSDCFUSCHEDAPPT_GEN_ALL_CORE_FT
Anderson Stewart  [facility]ician [facility]  NEUROSURG [address] [place] [address]  Scheduled Appointment: 02/15/2023

## 2023-01-27 NOTE — OCCUPATIONAL THERAPY INITIAL EVALUATION ADULT - PERTINENT HX OF CURRENT PROBLEM, REHAB EVAL
51 y/o M with PMHx of HTN, HLD, DM2 on metformin, asthma (intubated 1994 2/2 to exacerbation, after that controlled with albuterol inhaler as needed ), covid 19 mild 9/2022 and anxiety. Positive family history of ruptured cerebral aneurysm, father passed July 2022, brother with cavernous aneurysm, MRA brain 10/2022 left ICA ophthalmic aneurysm s/p diagnostic angiogram 10/27/22 demonstrating 5.6mm left ICA ophthalmic aneurysm now planned for cerebral angiogram and embolization 1/26/23  HOSPITAL COURSE: Patient s/p L opthalmic aneurysm pipeline stenting x2.

## 2023-01-27 NOTE — PHYSICAL THERAPY INITIAL EVALUATION ADULT - WEIGHT-BEARING RESTRICTIONS: GAIT, REHAB EVAL
Parents educated on role of physical therapy, positioning and positive deep input.
full weight-bearing

## 2023-01-27 NOTE — PROGRESS NOTE ADULT - SUBJECTIVE AND OBJECTIVE BOX
SUMMARY: 51 y/o M with PMHx of HTN, HLD, DM2 on metformin, asthma (intubated 1994 2/2 to exacerbation, after that controlled with albuterol inhaler as needed ), covid 19 mild 9/2022 and anxiety. 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 blount       ADMISSION SCORES:   GCS: 15       HOSPITAL COURSE: Patient s/p L opthalmic aneurysm pipeline stenting x2.      24 HOUR EVENTS: Post op patient stable, no gilles-op complications                REVIEW OF SYSTEMS: REVIEW OF SYSTEMS: [] Unable to Assess due to neurologic exam   [x ] All ROS addressed below are non-contributory, except:  Neuro: [ ] Headache [ ] Back pain [ ] Numbness [ ] Weakness [ ] Ataxia [ ] Dizziness [ ] Aphasia [ ] Dysarthria [ ] Visual disturbance  Resp: [ ] Shortness of breath/dyspnea [ ] Orthopnea [ ] Cough  CV: [ ] Chest pain [ ] Palpitation [ ] Lightheadedness [ ] Syncope  Renal: [ ] Thirst [ ] Edema  GI: [ ] Nausea [ ] Emesis [ ] Abdominal pain [ ] Constipation [ ] Diarrhea  Hem: [ ] Hematemesis [ ] bBright red blood per rectum  ID: [ ] Fever [ ] Chills [ ] Dysuria  ENT: [ ] Rhinorrhea        ALLERGIES:   Allergies      LABS:  Reviewed                DEVICES:   [] Restraints [x] PIVs [] ET tube [] central line [] PICC [x] arterial line [x] trammell [] NGT/OGT [] EVD [] LD [] CHARLEY/HMV [] LiCOX [] ICP monitor [] Trach [] PEG [] Chest Tube [] other:    EXAMINATION:  General: No acute distress  HEENT: Anicteric sclerae  Cardiac: [id number]  Lungs: Clear  Abdomen: Soft, non-tender, +BS  Extremities: No c/c/e  Skin/Incision Site: Clean, dry and intact. no groin hematoma . nl LE dopplers   Neurologic: Awake, alert, fully oriented, follows commands, PERRL, VFFtc, EOMI, face symmetric, tongue midline, no drift, full strength. RLE in safeguard but strong distally.  Groin clean, non bloody, nontender, pedal pulses strong bilaterally     SUMMARY: 51 y/o M with PMHx of HTN, HLD, DM2 on metformin, asthma (intubated 1994 2/2 to exacerbation, after that controlled with albuterol inhaler as needed ), covid 19 mild 9/2022 and anxiety. 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 blount       ADMISSION SCORES:   GCS: 15       HOSPITAL COURSE: Patient s/p L opthalmic aneurysm pipeline stenting x2.      24 HOUR EVENTS: Post op patient stable, no gilles-op complications        PAST MEDICAL & SURGICAL HISTORY:  History of cerebral aneurysm      HTN (hypertension)      HLD (hyperlipidemia)      Asthma      Anxiety      2019 novel coronavirus disease (COVID-19)      Anxiety and depression      Ankle injury      DM2 (diabetes mellitus, type 2)      Fall      H/O colonoscopy  2 years ago              REVIEW OF SYSTEMS: REVIEW OF SYSTEMS: [] Unable to Assess due to neurologic exam   [x ] All ROS addressed below are non-contributory, except:  Neuro: [ ] Headache [ ] Back pain [ ] Numbness [ ] Weakness [ ] Ataxia [ ] Dizziness [ ] Aphasia [ ] Dysarthria [ ] Visual disturbance  Resp: [ ] Shortness of breath/dyspnea [ ] Orthopnea [ ] Cough  CV: [ ] Chest pain [ ] Palpitation [ ] Lightheadedness [ ] Syncope  Renal: [ ] Thirst [ ] Edema  GI: [ ] Nausea [ ] Emesis [ ] Abdominal pain [ ] Constipation [ ] Diarrhea  Hem: [ ] Hematemesis [ ] bBright red blood per rectum  ID: [ ] Fever [ ] Chills [ ] Dysuria  ENT: [ ] Rhinorrhea        ALLERGIES:   Allergies      LABS:  Reviewed          T(C): 36.4 (01-27-23 @ 08:00), Max: 36.9 (01-26-23 @ 18:00)  HR: 80 (01-27-23 @ 10:45) (73 - 106)  BP: 145/82 (01-27-23 @ 10:45) (106/63 - 153/87)  RR: 18 (01-27-23 @ 10:45) (14 - 18)  SpO2: 97% (01-27-23 @ 10:45) (93% - 100%)  01-26-23 @ 07:01  -  01-27-23 @ 07:00  --------------------------------------------------------  IN: 2420 mL / OUT: 2030 mL / NET: 390 mL    01-27-23 @ 07:01  -  01-27-23 @ 11:43  --------------------------------------------------------  IN: 70 mL / OUT: 0 mL / NET: 70 mL    acetaminophen     Tablet .. 650 milliGRAM(s) Oral every 6 hours PRN  albuterol    90 MICROgram(s) HFA Inhaler 2 Puff(s) Inhalation every 6 hours PRN  aspirin  chewable 81 milliGRAM(s) Oral <User Schedule>  atorvastatin 40 milliGRAM(s) Oral at bedtime  insulin lispro (ADMELOG) corrective regimen sliding scale   SubCutaneous three times a day before meals  insulin lispro (ADMELOG) corrective regimen sliding scale   SubCutaneous at bedtime  losartan 25 milliGRAM(s) Oral daily  sodium chloride 0.9%. 1000 milliLiter(s) IV Continuous <Continuous>  ticagrelor 90 milliGRAM(s) Oral <User Schedule>        DEVICES:   [] Restraints [x] PIVs [] ET tube [] central line [] PICC [x] arterial line [x] trammell [] NGT/OGT [] EVD [] LD [] CHARLEY/HMV [] LiCOX [] ICP monitor [] Trach [] PEG [] Chest Tube [] other:    EXAMINATION:  General: No acute distress  HEENT: Anicteric sclerae  Cardiac: [id number]  Lungs: Clear  Abdomen: Soft, non-tender, +BS  Extremities: No c/c/e  Skin/Incision Site: Clean, dry and intact. no groin hematoma . nl LE dopplers   Neurologic: Awake, alert, fully oriented, follows commands, PERRL, VFFtc, EOMI, face symmetric, tongue midline, no drift, full strength. RLE in safeguard but strong distally.  Groin clean, non bloody, nontender, pedal pulses strong bilaterally      LABS:  Na: 140 (01-26 @ 23:38), 142 (01-26 @ 14:29)  K: 4.0 (01-26 @ 23:38), 3.6 (01-26 @ 14:29)  Cl: 107 (01-26 @ 23:38), 107 (01-26 @ 14:29)  CO2: 22 (01-26 @ 23:38), 23 (01-26 @ 14:29)  BUN: 12 (01-26 @ 23:38), 10 (01-26 @ 14:29)  Cr: 0.74 (01-26 @ 23:38), 0.78 (01-26 @ 14:29)  Glu: 142(01-26 @ 23:38), 131(01-26 @ 14:29)    Hgb: 14.0 (01-26 @ 23:38), 14.1 (01-26 @ 14:29)  Hct: 41.7 (01-26 @ 23:38), 41.6 (01-26 @ 14:29)  WBC: 9.41 (01-26 @ 23:38), 6.32 (01-26 @ 14:29)  Plt: 186 (01-26 @ 23:38), 164 (01-26 @ 14:29)    INR:   PTT:           ACC: [id number] EXAM:  MR ANGIO BRAIN   ORDERED BY: CRISTIAN PEASRON     ACC: [id number] EXAM:  MR BRAIN   ORDERED BY: CRISTIAN PEARSON     PROCEDURE DATE:  01/27/2023          INTERPRETATION:  CLINICAL INDICATION: Post stent placement for left   ophthalmic level internal carotid artery aneurysm      Magnetic resonance imaging of the brain was carried out with transaxial   SPGR, FLAIR, fast spin echo T2 weighted images, axial susceptibility   weighted series, diffusion weighted series and sagittal [id number]   series on a 1.5 Anayeli magnet.  In  Comparison is made with the prior conventional angiogram of 10/27/2022   and 1/26/2023.    The fourth, third and lateral ventricles are normal in size and position.   There is no hemorrhage, mass or shiftof the midline structures. Minimal   small vessel white matter ischemic changes are noted. No acute infarcts   are seen. There is no old or new hemorrhage. The sellar and parasellar   structures are unremarkable. The paranasal sinuses are clear.    3-D axial noncontrast MRA were performed on the cervical and intracranial   vessels, respectively. Intravascular flow quantification was performed   using gated 2D phase contrast MR, imaged perpendicular to the vessel   axis.  Images were post processed NOVA software and a NOVA flow study   report is available.    There is signal dropout overlying the left cavernous and supraclinoid   internal carotid artery from the presence of a stent. 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 The right vertebral artery is dominant.    Flow is as follows in milliliters per minute.    VINOD 137, RMCA 132, RACA2 79    LICA 355, LMCA 174, LACA 163, LACA2 71, RACA2 79    , LVA 94, , RPCA 80, LPCA 82    IMPRESSION: No acute infarcts or hemorrhage. Signal dropout overlying the   left cavernous and supraclinoid internal carotid artery related to the   presence of stent. Good intracranial flow using noninvasive flow MR   angiography.

## 2023-01-27 NOTE — DISCHARGE NOTE PROVIDER - HOSPITAL COURSE
51 y/o M with PMHx of HTN, HLD, DM2 on metformin, asthma (intubated 1994 2/2 to exacerbation, after that controlled with albuterol inhaler as needed ), covid 19 mild 9/2022 and anxiety. 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 Stewart for pre-angio.     On 1/26/23 patient went to the angio suite for an elective / planned pipeline stenting of the left ICA for the opthalmic aneurysm with Dr. Stewart, patient tolerated procedure well and was under the NSCU for post-angio care and management. Patient continued his Aspirin 81 mg and Brillinta 90mg BID post-angio. Patient's right groin post-procedure was soft / no bruising. Patient's trammell was removed on 1/27 and patient was voiding appropriately. On 1/27 patient had her MRI NOVA which was _______. On 1/27 patient worked with Physical Therapy and Occupational Therapy and deemed a candidate for ______. On day of discharge patient is hemodynamically and neurologically stable for discharge.     More than 30 minutes were spent educating the patient and family regarding condition, medications, follow up plans, signs and symptoms to be concerned with, preparing paperwork, and questions answered regarding discharge. 53 y/o M with PMHx of HTN, HLD, DM2 on metformin, asthma (intubated 1994 2/2 to exacerbation, after that controlled with albuterol inhaler as needed ), covid 19 mild 9/2022 and anxiety. 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 Stewart for pre-angio.     On 1/26/23 patient went to the angio suite for an elective / planned pipeline stenting of the left ICA for the opthalmic aneurysm with Dr. Stewart, patient tolerated procedure well and was under the NSCU for post-angio care and management. Patient continued his Aspirin 81 mg and Brillinta 90mg BID post-angio. Patient's right groin post-procedure was soft / no bruising. Patient's trammell was removed on 1/27 and patient was voiding appropriately. On 1/27 patient had her MRI NOVA which was showed good intracranial flow. On 1/27 patient worked with Physical Therapy and Occupational Therapy and deemed a candidate for home with no skilled needs On day of discharge patient is hemodynamically and neurologically stable for discharge.     More than 30 minutes were spent educating the patient and family regarding condition, medications, follow up plans, signs and symptoms to be concerned with, preparing paperwork, and questions answered regarding discharge.

## 2023-01-27 NOTE — DISCHARGE NOTE PROVIDER - CARE PROVIDER_API CALL
Anderson Stewart (MD; MS)  Unallocated  [facility], [address] [place] [id number]  Phone: [phone]  Fax: [phone]  Follow Up Time:

## 2023-01-27 NOTE — DISCHARGE NOTE PROVIDER - NSDCMRMEDTOKEN_GEN_ALL_CORE_FT
Albuterol (Eqv-ProAir HFA) 90 mcg/inh inhalation aerosol: 2 puff(s) inhaled every 6 hours, As Needed  Aspir 81 oral delayed release tablet: 1 tab(s) orally once a day  atorvastatin 40 mg oral tablet: 1 tab(s) orally once a day  Brilinta (ticagrelor) 90 mg oral tablet: 1 tab(s) orally 2 times a day  losartan 25 mg oral tablet: 1 tab(s) orally once a day  metFORMIN 500 mg oral tablet: 1 tab(s) orally once a day--evening  Omega-3 1000 mg oral capsule: 2 cap(s) orally once a day   acetaminophen 325 mg oral tablet: 2 tab(s) orally every 6 hours, As needed, Temp greater or equal to 38C (100.4F), Mild Pain (1 - 3)  Albuterol (Eqv-ProAir HFA) 90 mcg/inh inhalation aerosol: 2 puff(s) inhaled every 6 hours, As Needed  Aspir 81 oral delayed release tablet: 1 tab(s) orally once a day  atorvastatin 40 mg oral tablet: 1 tab(s) orally once a day  Brilinta (ticagrelor) 90 mg oral tablet: 1 tab(s) orally 2 times a day  losartan 25 mg oral tablet: 1 tab(s) orally once a day  metFORMIN 500 mg oral tablet: 1 tab(s) orally once a day--evening  Omega-3 1000 mg oral capsule: 2 cap(s) orally once a day

## 2023-01-27 NOTE — PHYSICAL THERAPY INITIAL EVALUATION ADULT - NSPTDISCHREC_GEN_A_CORE
DC home with no skilled PT needs, owns crutches/cane if needs, no DME needed at this time, neurosx PA [id number] aware./No skilled PT needs

## 2023-01-27 NOTE — DISCHARGE NOTE PROVIDER - NSDCCPCAREPLAN_GEN_ALL_CORE_FT
PRINCIPAL DISCHARGE DIAGNOSIS  Diagnosis: Cerebral aneurysm  Assessment and Plan of Treatment: s/p angio: left ICA pipeline for opthalmic aneurysm 1/26/23  Please make an appointment and follow up with Dr. Stewart in 1-2 weeks after discharge. Please continue to take Aspirin 81mg daily and Brillinta 90mg twice a day as prescribed, these medications are important for the treatment of your aneurysm. These medication is a blood thinner. Please consult your physician if you have any evidence of bleeding, significant bruising, bloody or dark tarry stools. You may take Tylenol (Acetaminophen) as needed for pain control. Please DO NOT take any NSAIDs (Advil, Aleve, Motrin, Ibuprofen) as these medications can increase your risk of bleeding. You may shower however please no soaking in the bath, you have a right groin puncture from angio - please keep puncture clean and dry.   Please make an appointment and follow up with your primary care provider in 1-2 weeks after discharge.      SECONDARY DISCHARGE DIAGNOSES  Diagnosis: DM2 (diabetes mellitus, type 2)  Assessment and Plan of Treatment: Please make an appointment and follow up with your primary care provider after discharge. You may restart your Metformin on Saturday 1/28/23. Please continue with low carb / sugar free diet.    Diagnosis: Hypertension  Assessment and Plan of Treatment: Please make an appointment and follow up with your primary care provider after discharge. Please continue your Losartan as prescribed by your doctor.    Diagnosis: Hyperlipidemia  Assessment and Plan of Treatment: Please make an appointment and follow up with your primary care provider after discharge. Please continue to take your Lipitor as prescribed by your doctor.     PRINCIPAL DISCHARGE DIAGNOSIS  Diagnosis: Cerebral aneurysm  Assessment and Plan of Treatment: s/p angio: left ICA pipeline for opthalmic aneurysm 1/26/23  Please follow up with Dr. Stewart on his office on 2/15/23 at 11AM.  Please continue to take Aspirin 81mg daily and Brillinta 90mg twice a day as prescribed, these medications are important for the treatment of your aneurysm. These medication is a blood thinner. Please consult your physician if you have any evidence of bleeding, significant bruising, bloody or dark tarry stools. You may take Tylenol (Acetaminophen) as needed for pain control. Please DO NOT take any NSAIDs (Advil, Aleve, Motrin, Ibuprofen) as these medications can increase your risk of bleeding. You may shower however please no soaking in the bath, you have a right groin puncture from angio - please keep puncture clean and dry.   Please make an appointment and follow up with your primary care provider in 1-2 weeks after discharge.      SECONDARY DISCHARGE DIAGNOSES  Diagnosis: DM2 (diabetes mellitus, type 2)  Assessment and Plan of Treatment: Please make an appointment and follow up with your primary care provider after discharge. You may restart your Metformin on Saturday 1/28/23. Please continue with low carb / sugar free diet.    Diagnosis: Hypertension  Assessment and Plan of Treatment: Please make an appointment and follow up with your primary care provider after discharge. Please continue your Losartan as prescribed by your doctor.    Diagnosis: Hyperlipidemia  Assessment and Plan of Treatment: Please make an appointment and follow up with your primary care provider after discharge. Please continue to take your Lipitor as prescribed by your doctor.

## 2023-01-27 NOTE — DISCHARGE NOTE NURSING/CASE MANAGEMENT/SOCIAL WORK - NSDCPEFALRISK_GEN_ALL_CORE
For information on Fall & Injury Prevention, visit: [email or web address] OR  [email or web address] OR  https://www.cdc.gov/steadi/patient.html

## 2023-01-27 NOTE — PROGRESS NOTE ADULT - SUBJECTIVE AND OBJECTIVE BOX
Patient seen and examined at bedside.    --Anticoagulation--  aspirin  chewable 81 milliGRAM(s) Oral <User Schedule>  ticagrelor 90 milliGRAM(s) Oral <User Schedule>    T(C): 36.2 (01-27-23 @ 00:00), Max: 36.9 (01-26-23 @ 18:00)  HR: 86 (01-27-23 @ 02:00) (83 - 106)  BP: 124/69 (01-27-23 @ 02:00) (106/63 - 153/91)  RR: 16 (01-27-23 @ 02:00) (15 - 18)  SpO2: 99% (01-27-23 @ 02:00) (93% - 100%)  Wt(kg): --    Exam: aox3, eomi, perrl, vff, caballero 5/5, groin c/d/i/soft, distal pulses wnl    .  LABS:                         14.0   9.41  )-----------( 186      ( 26 Jan 2023 23:38 )             41.7     01-26    140  |  107  |  12  ----------------------------<  142<H>  4.0   |  22  |  0.74    Ca    8.9      26 Jan 2023 23:38  Phos  2.7     01-26  Mg     2.2     01-26                RADIOLOGY, EKG & ADDITIONAL TESTS: Reviewed.

## 2023-01-27 NOTE — PHYSICAL THERAPY INITIAL EVALUATION ADULT - ADDITIONAL COMMENTS
Pt lives alone in home, +2 steps to enter, +flight of stairs in home, hx fall with ankle injury in 11/22- resulting in R CAM boot, rehab stay and use of crutches/cane (f/u with podiatry this Tuesday), recently independent, owns crutches/cane, drives and recently due to injury laid off from work; brother lives in [place] (pt in [place]), +walkin  shower upstairs

## 2023-01-27 NOTE — PROGRESS NOTE ADULT - ASSESSMENT
ASSESSMENT/PLAN: 51 y/o male with history of DM, DL, HTN, asthma presents for L opthalmic aneurysm stenting, elective procedure. POD 0    NEURO:  Neurochecks Q1H  Will continue on ASA, brilinta daily  Will obtain MRI, MR Hall tomorrow morning  Pain control  Activity: [] mobilize as tolerated [x] Bedrest x 4 hours post angio [] PT [] OT [] PMNR    PULM:  NC, wean to room air  Restart home inhalers as needed    CV:  History of hypertension  SBP goal 100-140 mmhg  Restart home losartan 25 mg QD  C/W home atorvastatin     RENAL:  Fluids: IVF until tolerating diet                                                                                                   GI:  Diet: NPO until dysphagia eval  GI prophylaxis [x] not indicated [] PPI [] other:  Bowel regimen [x] miralax [x] senna [] other:    ENDO:   History of DM II  ISS   hold home metformin  Goal euglycemia (-180)    HEME/ONC:  VTE prophylaxis: [x] SCDs [] chemoprophylaxis [x] hold chemoprophylaxis due to: fresh post op day0 [] high risk of DVT/PE on admission due to:    ID:  Afebrile  Post op labs pending     MISC:    SOCIAL/FAMILY:  [x] awaiting [] updated at bedside [] family meeting    CODE STATUS:  [x] Full Code [] DNR [] DNI [] Palliative/Comfort Care    DISPOSITION:  [x] ICU [] Stroke Unit [] Floor [] EMU [] RCU [] PCU    [x] Patient is at high risk of neurologic deterioration/death due to: aneurysmal rupture   ASSESSMENT/PLAN: 53 y/o male with history of DM, DL, HTN, asthma presents for L opthalmic aneurysm stenting, elective procedure. POD 0    NEURO:  Neurochecks Q 4 hr   Will continue on ASA, brilinta daily   MRI, MR Hall No acute infarcts or hemorrhage. Signal dropout overlying the   left cavernous and supraclinoid internal carotid artery related to the   presence of stent. Good intracranial flow using noninvasive flow MR   angiography.  ambulate       PULM:  RA     CV:  History of hypertension  SBP goal 100-160 mmhg  on  home losartan 25 mg QD    RENAL:  IVL                                                                                                  GI:  Diet: CCD diet    GI prophylaxis [x] not indicated [] PPI [] other:  Bowel regimen [x] miralax [x] senna [] other:    ENDO:   History of DM II  ISS   resume metformin at home   Goal euglycemia (-180)    going home per NS      ID:  Afebrile  Post op labs pending     MISC:    SOCIAL/FAMILY:  [x] awaiting [] updated at bedside [] family meeting    not critical

## 2023-01-27 NOTE — PHYSICAL THERAPY INITIAL EVALUATION ADULT - GENERAL OBSERVATIONS, REHAB EVAL
Pt is s/p L opthalmic aneurysm pipeline stenting x2 1/26/23. Pt received supine in bed in PACU, +sleeping and easily arousable, +IVL, +BP cuff, +tele, +pulse ox, A&OX4, pleasant and cooperative.

## 2023-01-27 NOTE — DISCHARGE NOTE NURSING/CASE MANAGEMENT/SOCIAL WORK - PATIENT PORTAL LINK FT
You can access the FollowMyHealth Patient Portal offered by [facility] by registering at the following website: [email or web address]. By joining [facility]’s FollowMyHealth portal, you will also be able to view your health information using other applications (apps) compatible with our system.

## 2023-01-27 NOTE — DISCHARGE NOTE PROVIDER - NSDCFUADDINST_GEN_ALL_CORE_FT
Please make all necessary appointments and follow up. Please DO NOT take any NSAIDs (Advil, Aleve, Motrin, Ibuprofen) until cleared by your Neurosurgeon. Please DO NOT do any heavy lifting, bending, twisting and straining. You may shower, but NO SOAP / NO SHAMPOO. DO NOT do any scrubbing. Pat dry only. Please come to the emergency room for any of the following: altered mental status, seizures, pain uncontrolled by pain medications, fevers, leaking / bleeding from angio groin puncture site, chest pain and shortness of breath.

## 2023-01-28 ENCOUNTER — INPATIENT (INPATIENT)
Facility: HOSPITAL | Age: 53
LOS: 2 days | Discharge: HOME | DRG: 66 | End: 2023-01-31
Attending: NEUROLOGICAL SURGERY | Admitting: NEUROLOGICAL SURGERY
Payer: MEDICAID

## 2023-01-28 VITALS
WEIGHT: 186.07 LBS | HEIGHT: 67 IN | TEMPERATURE: 99 F | DIASTOLIC BLOOD PRESSURE: 100 MMHG | RESPIRATION RATE: 20 BRPM | HEART RATE: 92 BPM | SYSTOLIC BLOOD PRESSURE: 168 MMHG | OXYGEN SATURATION: 98 %

## 2023-01-28 DIAGNOSIS — I61.9 NONTRAUMATIC INTRACEREBRAL HEMORRHAGE, UNSPECIFIED: ICD-10-CM

## 2023-01-28 DIAGNOSIS — Z98.890 OTHER SPECIFIED POSTPROCEDURAL STATES: Chronic | ICD-10-CM

## 2023-01-28 PROBLEM — U07.1 COVID-19: Chronic | Status: ACTIVE | Noted: 2023-01-24

## 2023-01-28 PROBLEM — S99.919A UNSPECIFIED INJURY OF UNSPECIFIED ANKLE, INITIAL ENCOUNTER: Chronic | Status: ACTIVE | Noted: 2023-01-24
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
## 2023-01-28 PROBLEM — F41.9 ANXIETY DISORDER, UNSPECIFIED: Chronic | Status: ACTIVE | Noted: 2023-01-24

## 2023-01-28 PROBLEM — E11.9 TYPE 2 DIABETES MELLITUS WITHOUT COMPLICATIONS: Chronic | Status: ACTIVE | Noted: 2023-01-24

## 2023-01-28 LAB
ALBUMIN SERPL ELPH-MCNC: 4.6 G/DL — SIGNIFICANT CHANGE UP (ref 3.3–5)
ALP SERPL-CCNC: 74 U/L — SIGNIFICANT CHANGE UP (ref 40–120)
ALT FLD-CCNC: 15 U/L — SIGNIFICANT CHANGE UP (ref 10–45)
ANION GAP SERPL CALC-SCNC: 11 MMOL/L — SIGNIFICANT CHANGE UP (ref 5–17)
APTT BLD: 31.1 SEC — SIGNIFICANT CHANGE UP (ref 27.5–35.5)
AST SERPL-CCNC: 14 U/L — SIGNIFICANT CHANGE UP (ref 10–40)
BASOPHILS # BLD AUTO: 0.04 K/UL — SIGNIFICANT CHANGE UP (ref 0–0.2)
BASOPHILS NFR BLD AUTO: 0.4 % — SIGNIFICANT CHANGE UP (ref 0–2)
BILIRUB SERPL-MCNC: 1 MG/DL — SIGNIFICANT CHANGE UP (ref 0.2–1.2)
BUN SERPL-MCNC: 13 MG/DL — SIGNIFICANT CHANGE UP (ref 7–23)
CALCIUM SERPL-MCNC: 9.4 MG/DL — SIGNIFICANT CHANGE UP (ref 8.4–10.5)
CHLORIDE SERPL-SCNC: 103 MMOL/L — SIGNIFICANT CHANGE UP (ref 96–108)
CO2 SERPL-SCNC: 24 MMOL/L — SIGNIFICANT CHANGE UP (ref 22–31)
CREAT SERPL-MCNC: 0.75 MG/DL — SIGNIFICANT CHANGE UP (ref 0.5–1.3)
EGFR: 109 ML/MIN/1.73M2 — SIGNIFICANT CHANGE UP
EOSINOPHIL # BLD AUTO: 0.09 K/UL — SIGNIFICANT CHANGE UP (ref 0–0.5)
EOSINOPHIL NFR BLD AUTO: 0.9 % — SIGNIFICANT CHANGE UP (ref 0–6)
FLUAV AG NPH QL: SIGNIFICANT CHANGE UP
FLUBV AG NPH QL: SIGNIFICANT CHANGE UP
GLUCOSE BLDC GLUCOMTR-MCNC: 105 MG/DL — HIGH (ref 70–99)
GLUCOSE BLDC GLUCOMTR-MCNC: 143 MG/DL — HIGH (ref 70–99)
GLUCOSE SERPL-MCNC: 120 MG/DL — HIGH (ref 70–99)
HCT VFR BLD CALC: 46 % — SIGNIFICANT CHANGE UP (ref 39–50)
HGB BLD-MCNC: 15.4 G/DL — SIGNIFICANT CHANGE UP (ref 13–17)
IMM GRANULOCYTES NFR BLD AUTO: 0.5 % — SIGNIFICANT CHANGE UP (ref 0–0.9)
INR BLD: 1.01 RATIO — SIGNIFICANT CHANGE UP (ref 0.88–1.16)
LYMPHOCYTES # BLD AUTO: 1.45 K/UL — SIGNIFICANT CHANGE UP (ref 1–3.3)
LYMPHOCYTES # BLD AUTO: 14.4 % — SIGNIFICANT CHANGE UP (ref 13–44)
Lab: 41 PRU — LOW (ref 195–417)
MCHC RBC-ENTMCNC: 31.1 PG — SIGNIFICANT CHANGE UP (ref 27–34)
MCHC RBC-ENTMCNC: 33.5 GM/DL — SIGNIFICANT CHANGE UP (ref 32–36)
MCV RBC AUTO: 92.9 FL — SIGNIFICANT CHANGE UP (ref 80–100)
MONOCYTES # BLD AUTO: 0.51 K/UL — SIGNIFICANT CHANGE UP (ref 0–0.9)
MONOCYTES NFR BLD AUTO: 5.1 % — SIGNIFICANT CHANGE UP (ref 2–14)
NEUTROPHILS # BLD AUTO: 7.93 K/UL — HIGH (ref 1.8–7.4)
NEUTROPHILS NFR BLD AUTO: 78.7 % — HIGH (ref 43–77)
NRBC # BLD: 0 /100 WBCS — SIGNIFICANT CHANGE UP (ref 0–0)
PA ADP PRP-ACNC: 41 PRU — LOW (ref 194–417)
PA ADP PRP: 41 % — HIGH
PLATELET # BLD AUTO: 171 K/UL — SIGNIFICANT CHANGE UP (ref 150–400)
PLATELET RESPONSE ASPIRIN RESULT: 359 ARU — SIGNIFICANT CHANGE UP (ref 350–700)
POTASSIUM SERPL-MCNC: 4.1 MMOL/L — SIGNIFICANT CHANGE UP (ref 3.5–5.3)
POTASSIUM SERPL-SCNC: 4.1 MMOL/L — SIGNIFICANT CHANGE UP (ref 3.5–5.3)
PROT SERPL-MCNC: 7.5 G/DL — SIGNIFICANT CHANGE UP (ref 6–8.3)
PROTHROM AB SERPL-ACNC: 11.6 SEC — SIGNIFICANT CHANGE UP (ref 10.5–13.4)
RBC # BLD: 4.95 M/UL — SIGNIFICANT CHANGE UP (ref 4.2–5.8)
RBC # FLD: 12.2 % — SIGNIFICANT CHANGE UP (ref 10.3–14.5)
RSV RNA NPH QL NAA+NON-PROBE: SIGNIFICANT CHANGE UP
SARS-COV-2 RNA SPEC QL NAA+PROBE: SIGNIFICANT CHANGE UP
SODIUM SERPL-SCNC: 138 MMOL/L — SIGNIFICANT CHANGE UP (ref 135–145)
TROPONIN T, HIGH SENSITIVITY RESULT: 7 NG/L — SIGNIFICANT CHANGE UP (ref 0–51)
WBC # BLD: 10.07 K/UL — SIGNIFICANT CHANGE UP (ref 3.8–10.5)
WBC # FLD AUTO: 10.07 K/UL — SIGNIFICANT CHANGE UP (ref 3.8–10.5)

## 2023-01-28 PROCEDURE — 70450 CT HEAD/BRAIN W/O DYE: CPT | Mod: 26,59

## 2023-01-28 PROCEDURE — 99223 1ST HOSP IP/OBS HIGH 75: CPT

## 2023-01-28 PROCEDURE — 99222 1ST HOSP IP/OBS MODERATE 55: CPT | Mod: GC

## 2023-01-28 PROCEDURE — 99291 CRITICAL CARE FIRST HOUR: CPT

## 2023-01-28 PROCEDURE — 70498 CT ANGIOGRAPHY NECK: CPT | Mod: 26

## 2023-01-28 PROCEDURE — 70496 CT ANGIOGRAPHY HEAD: CPT | Mod: 26

## 2023-01-28 RX ORDER — LOSARTAN POTASSIUM 100 MG/1
25 TABLET, FILM COATED ORAL DAILY
Refills: 0 | Status: DISCONTINUED | OUTPATIENT
Start: 2023-01-28 | End: 2023-01-28

## 2023-01-28 RX ORDER — ATORVASTATIN CALCIUM 80 MG/1
40 TABLET, FILM COATED ORAL AT BEDTIME
Refills: 0 | Status: DISCONTINUED | OUTPATIENT
Start: 2023-01-28 | End: 2023-01-28

## 2023-01-28 RX ORDER — DEXTROSE 50 % IN WATER 50 %
15 SYRINGE (ML) INTRAVENOUS ONCE
Refills: 0 | Status: DISCONTINUED | OUTPATIENT
Start: 2023-01-28 | End: 2023-01-31

## 2023-01-28 RX ORDER — DEXTROSE 50 % IN WATER 50 %
12.5 SYRINGE (ML) INTRAVENOUS ONCE
Refills: 0 | Status: DISCONTINUED | OUTPATIENT
Start: 2023-01-28 | End: 2023-01-31

## 2023-01-28 RX ORDER — OXYCODONE HYDROCHLORIDE 5 MG/1
5 TABLET ORAL EVERY 4 HOURS
Refills: 0 | Status: DISCONTINUED | OUTPATIENT
Start: 2023-01-28 | End: 2023-01-30

## 2023-01-28 RX ORDER — GLUCAGON INJECTION, SOLUTION 0.5 MG/.1ML
1 INJECTION, SOLUTION SUBCUTANEOUS ONCE
Refills: 0 | Status: DISCONTINUED | OUTPATIENT
Start: 2023-01-28 | End: 2023-01-31

## 2023-01-28 RX ORDER — ACETAMINOPHEN 500 MG
1000 TABLET ORAL ONCE
Refills: 0 | Status: COMPLETED | OUTPATIENT
Start: 2023-01-28 | End: 2023-01-28

## 2023-01-28 RX ORDER — INSULIN LISPRO 100/ML
VIAL (ML) SUBCUTANEOUS AT BEDTIME
Refills: 0 | Status: DISCONTINUED | OUTPATIENT
Start: 2023-01-28 | End: 2023-01-31

## 2023-01-28 RX ORDER — ATORVASTATIN CALCIUM 80 MG/1
40 TABLET, FILM COATED ORAL AT BEDTIME
Refills: 0 | Status: DISCONTINUED | OUTPATIENT
Start: 2023-01-28 | End: 2023-01-31

## 2023-01-28 RX ORDER — TICAGRELOR 90 MG/1
60 TABLET ORAL
Refills: 0 | Status: DISCONTINUED | OUTPATIENT
Start: 2023-01-28 | End: 2023-01-31

## 2023-01-28 RX ORDER — SODIUM CHLORIDE 9 MG/ML
1000 INJECTION, SOLUTION INTRAVENOUS
Refills: 0 | Status: DISCONTINUED | OUTPATIENT
Start: 2023-01-28 | End: 2023-01-31

## 2023-01-28 RX ORDER — INSULIN LISPRO 100/ML
VIAL (ML) SUBCUTANEOUS
Refills: 0 | Status: DISCONTINUED | OUTPATIENT
Start: 2023-01-28 | End: 2023-01-31

## 2023-01-28 RX ORDER — DEXTROSE 50 % IN WATER 50 %
25 SYRINGE (ML) INTRAVENOUS ONCE
Refills: 0 | Status: DISCONTINUED | OUTPATIENT
Start: 2023-01-28 | End: 2023-01-31

## 2023-01-28 RX ORDER — ALBUTEROL 90 UG/1
2 AEROSOL, METERED ORAL EVERY 6 HOURS
Refills: 0 | Status: DISCONTINUED | OUTPATIENT
Start: 2023-01-28 | End: 2023-01-31

## 2023-01-28 RX ORDER — LABETALOL HCL 100 MG
20 TABLET ORAL ONCE
Refills: 0 | Status: DISCONTINUED | OUTPATIENT
Start: 2023-01-28 | End: 2023-01-28

## 2023-01-28 RX ORDER — NICARDIPINE HYDROCHLORIDE 30 MG/1
5 CAPSULE, EXTENDED RELEASE ORAL
Qty: 40 | Refills: 0 | Status: DISCONTINUED | OUTPATIENT
Start: 2023-01-28 | End: 2023-01-29

## 2023-01-28 RX ORDER — ASPIRIN/CALCIUM CARB/MAGNESIUM 324 MG
81 TABLET ORAL
Refills: 0 | Status: DISCONTINUED | OUTPATIENT
Start: 2023-01-29 | End: 2023-01-31

## 2023-01-28 RX ORDER — ACETAMINOPHEN 500 MG
650 TABLET ORAL EVERY 6 HOURS
Refills: 0 | Status: DISCONTINUED | OUTPATIENT
Start: 2023-01-28 | End: 2023-01-31

## 2023-01-28 RX ORDER — LOSARTAN POTASSIUM 100 MG/1
25 TABLET, FILM COATED ORAL DAILY
Refills: 0 | Status: DISCONTINUED | OUTPATIENT
Start: 2023-01-28 | End: 2023-01-29

## 2023-01-28 RX ADMIN — Medication 1000 MILLIGRAM(S): at 22:17

## 2023-01-28 RX ADMIN — TICAGRELOR 60 MILLIGRAM(S): 90 TABLET ORAL at 18:17

## 2023-01-28 RX ADMIN — NICARDIPINE HYDROCHLORIDE 25 MG/HR: 30 CAPSULE, EXTENDED RELEASE ORAL at 13:27

## 2023-01-28 RX ADMIN — Medication 400 MILLIGRAM(S): at 22:02

## 2023-01-28 RX ADMIN — LOSARTAN POTASSIUM 25 MILLIGRAM(S): 100 TABLET, FILM COATED ORAL at 13:27

## 2023-01-28 RX ADMIN — ATORVASTATIN CALCIUM 40 MILLIGRAM(S): 80 TABLET, FILM COATED ORAL at 22:02

## 2023-01-28 NOTE — ED ADULT NURSE NOTE - OBJECTIVE STATEMENT
Patient with primary history HTN, HLD,  diabetes, ophthalmic aneurysm status post left ICA double pipeline 2 days ago, came in for difficulty seeing and headache.  Woke up @ 3 AM with severe headache on the left side and states that he has trouble seeing the tail end of the words.  When he went to bed last night around midnight he was completely normal.  Pt is alert and orientedX4. Pt's gait steady. No paralysis. Emotional support offered.

## 2023-01-28 NOTE — CONSULT NOTE ADULT - ASSESSMENT
52y M with Hx HTN, HLD, DM2, recent aneurysm stent, presenting w/ CC of headache and visual loss      LKW: 12am 1/28/23  NIHSS:  1  Baseline MRS: 0  Not a tPA candidate due to acute ICH  Not a thrombectomy candidate due to lack of LVO    CT head: acute L occipital IPH  CTA: unremarkable    Impression:  acute R-sided visual loss and posterior headache. Found to have L occipital IPH. Likely related to recent initiation of dual anti-platelets to maintain patency of stent. Admitted to neurosurgery for close monitoring    Mechanism: pending further work-up    Recommendations:  [] agree w/ admission to NSCU  [] anti-platelets per neurosurgery  [] BP goals per neurosurgery  [] frequent neuro checks (q1-2h)  [] suggest repeat CTH w/o in 4-6h and at 24h  [] Atorvastatin 40-80 mg daily titrated per LDL < 70  [] HgbA1C, fasting lipid panel, CBC, CMP, coag panel, troponin  [] MRI brain w/o con (if not contraindicated)    - Tight glucose control (long-term goal HgbA1c < 6%)  - Stroke education and counseling  - Dysphagia screen. If fails, speech/swallow eval  - aspiration, fall precautions  - STAT CT head non-contrast for change in neuro exam.   - PT/ OT / DVT ppx per primary team     Discussed with stroke fellow  Dr. Josue Genao     and under supervision of attending Dr. Richard Libman      regarding decision against candidacy for tPA/ thrombectomy. Will be formally staffed on morning rounds with attending. Recommendations will be complete once signed by attending. 52y M with Hx HTN, HLD, DM2, recent aneurysm stent, presenting w/ CC of headache and visual loss      LKW: 12am 1/28/23  NIHSS:  1  Baseline MRS: 0  ICH score: 0  Not a tPA candidate due to acute ICH  Not a thrombectomy candidate due to lack of LVO    CT head: acute L occipital IPH  CTA: unremarkable    Impression:  acute R-sided visual loss and posterior headache. Found to have L occipital IPH. Likely related to recent initiation of dual anti-platelets to maintain patency of stent. Admitted to neurosurgery for close monitoring    Mechanism: pending further work-up    Recommendations:  [] agree w/ admission to NSCU  [] anti-platelets per neurosurgery  [] BP goals per neurosurgery  [] frequent neuro checks (q1-2h)  [] suggest repeat CTH w/o in 4-6h and at 24h  [] Atorvastatin 40-80 mg daily titrated per LDL < 70  [] HgbA1C, fasting lipid panel, CBC, CMP, coag panel, troponin  [] MRI brain w/o con (if not contraindicated)    - Tight glucose control (long-term goal HgbA1c < 6%)  - Stroke education and counseling  - Dysphagia screen. If fails, speech/swallow eval  - aspiration, fall precautions  - STAT CT head non-contrast for change in neuro exam.   - PT/ OT / DVT ppx per primary team     Discussed with stroke fellow  Dr. Josue Genao     and under supervision of attending Dr. Richard Libman      regarding decision against candidacy for tPA/ thrombectomy. Will be formally staffed on morning rounds with attending. Recommendations will be complete once signed by attending.

## 2023-01-28 NOTE — ED PROVIDER NOTE - ATTENDING CONTRIBUTION TO CARE
This is a 52-year-old male who has a history of aneurysm and was just released from the hospital after having stents placed.  He is on dual antiplatelet therapy and is coming in with visual disturbances.  On exam he is able to see in all 4 quadrants grossly however he has trouble reading words after the midline but when he squints he is able to see it.  I reviewed the CT scan and to my interpretation there is an intracranial hemorrhage.  For this reason no tPA was administered.  I discussed with Dr. Pelletier at the desk upon reviewing the imaging from the neurosurgical team.  At this time they are very hesitant to reverse the dual antiplatelet as the patient just had the stents placed and they are quite concerned about stent occlusion causing stroke.  At this time the patient will be admitted to the neurosurgical ICU for further evaluation and treatment and we will hold off on any reversal of antiplatelet at the advice of our neurosurgical consultants

## 2023-01-28 NOTE — H&P ADULT - ASSESSMENT
MARIELY CHENG  52M PMHx HTN, DM, HLD, dced yest after angio/PEDx2 of LICA for L oph a aneurysm (elective) w/ Dr. Stewart 1/26, p/w new R VF deficit and L occ heme on CTH. He did well post-proc, MR was dwi neg and MR NOVA w/ good flow. Was on ASA/Brilinta 90 BID w/ supratherapeutic p2y12 (8 postop and 5 preop). CTH w/ small 2.7cm L occ heme, CTA with good flow thro stent. Exam: AOx3, R inf VF deficit to finger counting, PERRL, EOMI, no facial, SANCHEZ 5/5.   - ADM NSCU q1h neuro checks  - cont ASA, will lower brilinta to 60 BID, fu P2Y12 today and again in AM after dose change  - no reversal, no SQL  - CTH again at 1PM, 9PM, and tomorrow AM  - on cardene for SBP<150 for now  - no need MRI MARIELY CHENG  52M PMHx HTN, DM, HLD, dced yest after angio/PEDx2 of LICA for L oph a aneurysm (elective) w/ Dr. Stewart 1/26, p/w new R VF deficit and L occ heme on CTH. He did well post-proc, MR was dwi neg and MR NOVA w/ good flow. Was on ASA/Brilinta 90 BID w/ therapeutic p2y12 (8 postop and 5 preop). CTH w/ small 2.7cm L occ heme, CTA with good flow through stent. Exam: AOx3, R inf VF deficit to finger counting, PERRL, EOMI, no facial, SANCHEZ 5/5.   - ADM NSCU q1h neuro checks  - cont ASA, will lower brilinta to 60 BID, fu P2Y12 today and again in AM after dose change  - no reversal, no SQL  - CTH again at 1PM, 9PM, and tomorrow AM  - on cardene for SBP<150 for now  - no need MRI

## 2023-01-28 NOTE — ED PROVIDER NOTE - PROGRESS NOTE DETAILS
AMANDA Novak (PGY-3) - pt w/ occipital hemorrhage. NSGY aware. No reversal of antiplatelets at this time 2/2 high likelihood of pipeline occlusion. Will admit to NSCU.

## 2023-01-28 NOTE — CHART NOTE - NSCHARTNOTEFT_GEN_A_CORE
CAPRINI SCORE [CLOT] Score on Admission for     AGE RELATED RISK FACTORS                                                       MOBILITY RELATED FACTORS  [x ] Age 41-60 years                                            (1 Point)                  [ ] Bed rest                                                        (1 Point)  [ ] Age: 61-74 years                                           (2 Points)                 [ ] Plaster cast                                                   (2 Points)  [ ] Age= 75 years                                              (3 Points)                 [ ] Bed bound for more than 72 hours                 (2 Points)    DISEASE RELATED RISK FACTORS                                               GENDER SPECIFIC FACTORS  [ ] Edema in the lower extremities                       (1 Point)                  [ ] Pregnancy                                                     (1 Point)  [ ] Varicose veins                                               (1 Point)                  [ ] Post-partum < 6 weeks                                   (1 Point)             [ ] BMI > 25 Kg/m2                                            (1 Point)                  [ ] Hormonal therapy  or oral contraception          (1 Point)                 [ ] Sepsis (in the previous month)                        (1 Point)                  [ ] History of pregnancy complications                 (1 point)  [ ] Pneumonia or serious lung disease                                               [ ] Unexplained or recurrent                     (1 Point)           (in the previous month)                               (1 Point)  [ ] Abnormal pulmonary function test                     (1 Point)                 SURGERY RELATED RISK FACTORS (include planned surgeries)  [ ] Acute myocardial infarction                              (1 Point)                 [ ]  Section                                             (1 Point)  [ ] Congestive heart failure (in the previous month)  (1 Point)               [ ] Minor surgery                                                  (1 Point)   [ ] Inflammatory bowel disease                             (1 Point)                 [ ] Arthroscopic surgery                                        (2 Points)  [ ] Central venous access                                      (2 Points)                [ ] General surgery lasting more than 45 minutes   (2 Points)       [ ] Stroke (in the previous month)                          (5 Points)               [ ] Elective arthroplasty                                         (5 Points)            [ ] Current or past malignancy                                (2 Points)                                                                                                     HEMATOLOGY RELATED FACTORS                                                 TRAUMA RELATED RISK FACTORS  [ ] Prior episodes of VTE                                     (3 Points)                [ ] Fracture of the hip, pelvis, or leg                       (5 Points)  [ ] Positive family history for VTE                         (3 Points)                 [ ] Acute spinal cord injury (in the previous month)  (5 Points)  [ ] Prothrombin 14406 A                                     (3 Points)                 [ ] Paralysis  (less than 1 month)                             (5 Points)  [ ] Factor V Leiden                                             (3 Points)                  [ ] Multiple Trauma within 1 month                        (5 Points)  [ ] Lupus anticoagulants                                     (3 Points)                                                           [ ] Anticardiolipin antibodies                               (3 Points)                                                       [ ] High homocysteine in the blood                      (3 Points)                                             [ ] Other congenital or acquired thrombophilia      (3 Points)                                                [ ] Heparin induced thrombocytopenia                  (3 Points)                                          Total Score [    1      ]    Risk:  Very low 0   Low 1 to 2   Moderate 3 to 4   High =5       VTE Prophylasix Recommednations:  [ x] mechanical pneumatic compression devices                                      [ ] contraindicated: _____________________  [ ] chemo prophylasix                                                                                   [ ] contraindicated _____________________    **** HIGH LIKELIHOOD DVT PRESENT ON ADMISSION  [ ] (please order LE dopplers within 24 hours of admission) CAPRINI SCORE [CLOT] Score on Admission for     AGE RELATED RISK FACTORS                                                       MOBILITY RELATED FACTORS  [x ] Age 41-60 years                                            (1 Point)                  [ ] Bed rest                                                        (1 Point)  [ ] Age: 61-74 years                                           (2 Points)                 [ ] Plaster cast                                                   (2 Points)  [ ] Age= 75 years                                              (3 Points)                 [ ] Bed bound for more than 72 hours                 (2 Points)    DISEASE RELATED RISK FACTORS                                               GENDER SPECIFIC FACTORS  [ ] Edema in the lower extremities                       (1 Point)                  [ ] Pregnancy                                                     (1 Point)  [ ] Varicose veins                                               (1 Point)                  [ ] Post-partum < 6 weeks                                   (1 Point)             [ x] BMI > 25 Kg/m2                                            (1 Point)                  [ ] Hormonal therapy  or oral contraception          (1 Point)                 [ ] Sepsis (in the previous month)                        (1 Point)                  [ ] History of pregnancy complications                 (1 point)  [ ] Pneumonia or serious lung disease                                               [ ] Unexplained or recurrent                     (1 Point)           (in the previous month)                               (1 Point)  [ ] Abnormal pulmonary function test                     (1 Point)                 SURGERY RELATED RISK FACTORS (include planned surgeries)  [ ] Acute myocardial infarction                              (1 Point)                 [ ]  Section                                             (1 Point)  [ ] Congestive heart failure (in the previous month)  (1 Point)               [ ] Minor surgery                                                  (1 Point)   [ ] Inflammatory bowel disease                             (1 Point)                 [ ] Arthroscopic surgery                                        (2 Points)  [ ] Central venous access                                      (2 Points)                [ ] General surgery lasting more than 45 minutes   (2 Points)       [ ] Stroke (in the previous month)                          (5 Points)               [ ] Elective arthroplasty                                         (5 Points)            [ ] Current or past malignancy                                (2 Points)                                                                                                     HEMATOLOGY RELATED FACTORS                                                 TRAUMA RELATED RISK FACTORS  [ ] Prior episodes of VTE                                     (3 Points)                [ ] Fracture of the hip, pelvis, or leg                       (5 Points)  [ ] Positive family history for VTE                         (3 Points)                 [ ] Acute spinal cord injury (in the previous month)  (5 Points)  [ ] Prothrombin 43626 A                                     (3 Points)                 [ ] Paralysis  (less than 1 month)                             (5 Points)  [ ] Factor V Leiden                                             (3 Points)                  [ ] Multiple Trauma within 1 month                        (5 Points)  [ ] Lupus anticoagulants                                     (3 Points)                                                           [ ] Anticardiolipin antibodies                               (3 Points)                                                       [ ] High homocysteine in the blood                      (3 Points)                                             [ ] Other congenital or acquired thrombophilia      (3 Points)                                                [ ] Heparin induced thrombocytopenia                  (3 Points)                                          Total Score [    2     ]    Risk:  Very low 0   Low 1 to 2   Moderate 3 to 4   High =5       VTE Prophylasix Recommednations:  [ x] mechanical pneumatic compression devices                                      [ ] contraindicated: _____________________  [ ] chemo prophylasix                                                                                   [ ] contraindicated _____________________    **** HIGH LIKELIHOOD DVT PRESENT ON ADMISSION  [ ] (please order LE dopplers within 24 hours of admission)

## 2023-01-28 NOTE — ED ADULT NURSE NOTE - CHIEF COMPLAINT QUOTE
s/p embolization for left opthalmic aneurysm Thursday, vision change and headache at 0300 this morning, "can't read the last letter"

## 2023-01-28 NOTE — CONSULT NOTE ADULT - TIME BILLING
52-year-old gentleman evaluated at [facility] on 1/28/2023 with headache and right-sided visual field disturbance.  History and exam as above, with minor changes.  ROS otherwise negative.  CT head (1/27/2023) to my eye showed a small-moderate-sized left occipital lobar hemorrhage, measured at 2.7 x 2.4 cm.  P2Y12 = 41.    Impression.  He has a family history of aneurysmal subarachnoid hemorrhage and in the past underwent screening for intracranial aneurysm.  The screening turned up a left ophthalmic region aneurysm which was treated with stenting on 1/26/2023.  He was then placed on aspirin and ticagrelor.  On 1/27/2023 he developed headache and by history, a right homonymous hemianopia, due to a left occipital lobar hemorrhage.  He has already had conventional angiography ruling out pre-existing vascular lesions such as an AVM and he is generally speaking to young for amyloid angiopathy.  The etiology of his hemorrhage may be related to a low P2Y12 and sensitivity to ticagrelor.  Suggest.  Repeat CT head; ticagrelor already decreased from 90 mg to 60 mg twice daily; further management as per neurosurgery; PT/OT.

## 2023-01-28 NOTE — PHYSICAL THERAPY INITIAL EVALUATION ADULT - ADDITIONAL COMMENTS
Pt lives in a private home alone, there are about 5 steps to enter, 1 flight of stairs to bedroom/bathroom. +left handrail. Pt performed ADL/IADLs  independently. Ambulates with no AD.

## 2023-01-28 NOTE — H&P ADULT - HISTORY OF PRESENT ILLNESS
52M PMHx HTN, DM, HLD, dced yest after angio/PEDx2 of LICA for L oph a aneurysm (elective) w/ Dr. Stewart 1/26, p/w new R VF deficit and L occ heme on CTH. He did well post-proc, MR was dwi neg and MR NOVA w/ good flow. Was on ASA/Brilinta 90 BID w/ supratherapeutic p2y12 (8 postop and 5 preop). CTH w/ small 2.7cm L occ heme, CTA with good flow thro stent. Exam: AOx3, R inf VF deficit to finger counting, PERRL, EOMI, no facial, SANCHEZ 5/5 52M PMHx HTN, DM, HLD, dced yest after angio/PEDx2 of LICA for L oph a aneurysm (elective) w/ Dr. Stewart 1/26, p/w new R VF deficit and L occ heme on CTH. He did well post-proc, MR was dwi neg and MR NOVA w/ good flow. Was on ASA/Brilinta 90 BID w/ therapeutic p2y12 (8 postop and 5 preop). CTH w/ small 2.7cm L occ heme, CTA with good flow through stent. Exam: AOx3, R inf VF deficit to finger counting, PERRL, EOMI, no facial, SANCHEZ 5/5

## 2023-01-28 NOTE — CONSULT NOTE ADULT - SUBJECTIVE AND OBJECTIVE BOX
Neurology - Consult Note    -  Spectra: [phone] ([facility]), [phone] ([facility])  -    HPI: Patient MARIELY CHENG is a 52y (1970) man with a PMHx significant for HTN, HLD, DM2, recent L ICA stent for ophthalmic aneurysm, who presents w/ CC of headache.    Review of Systems:  INCOMPLETE   CONSTITUTIONAL: No fevers or chills  EYES AND ENT: No visual changes or no throat pain   NECK: No pain or stiffness  RESPIRATORY: No hemoptysis or shortness of breath  CARDIOVASCULAR: No chest pain or palpitations  GASTROINTESTINAL: No melena or hematochezia  GENITOURINARY: No dysuria or hematuria  NEUROLOGICAL: +As stated in HPI above  SKIN: No itching, burning, rashes, or lesions   All other review of systems is negative unless indicated above.    Allergies:      PMHx/PSHx/Family Hx: As above, otherwise see below   History of cerebral aneurysm    HTN (hypertension)    HLD (hyperlipidemia)    Asthma    Anxiety    2019 novel coronavirus disease (COVID-19)    Anxiety and depression    Ankle injury    DM2 (diabetes mellitus, type 2)    Fall        Social Hx:  No current use of tobacco, alcohol, or illicit drugs  Lives with ***    Medications:  MEDICATIONS  (STANDING):    MEDICATIONS  (PRN):      Vitals:  T(C): 37.1 (01-28-23 @ 08:28), Max: 37.1 (01-28-23 @ 08:28)  HR: 92 (01-28-23 @ 08:28) (80 - 92)  BP: 168/100 (01-28-23 @ 08:28) (145/82 - 168/100)  RR: 20 (01-28-23 @ 08:28) (18 - 20)  SpO2: 98% (01-28-23 @ 08:28) (95% - 99%)    Physical Examination: INCOMPLETE  General - NAD  Cardiovascular - Peripheral pulses palpable, no edema  Eyes - Fundoscopy with flat, sharp optic discs and no hemorrhage or exudates; Fundoscopy not well visualized; Fundoscopy not performed due to safety precautions in the setting of the COVID-19 pandemic    Neurologic Exam:  Mental status - Awake, Alert, Oriented to person, place, and time. Speech fluent, repetition and naming intact. Follows simple and complex commands. Attention/concentration, recent and remote memory (including registration and recall), and fund of knowledge intact    Cranial nerves - PERRLA, VFF, EOMI, face sensation (V1-V3) intact b/l, facial strength intact without asymmetry b/l, hearing intact b/l, palate with symmetric elevation, trapezius OR sternocleidomastiod 5/5 strength b/l, tongue midline on protrusion with full lateral movement    Motor - Normal bulk and tone throughout. No pronator drift.  Strength testing            Deltoid      Biceps      Triceps     Wrist Extension    Wrist Flexion     Interossei         R            5                 5               5                     5                              5                        5                 5  L             5                 5               5                     5                              5                        5                 5              Hip Flexion    Hip Extension    Knee Flexion    Knee Extension    Dorsiflexion    Plantar Flexion  R              5                           5                       5                           5                            5                          5  L              5                           5                        5                           5                            5                          5    Sensation - Light touch/temperature OR pain/vibration intact throughout    DTR's -             Biceps      Triceps     Brachioradialis      Patellar    Ankle    Toes/plantar response  R             2+             2+                  2+                       2+            2+                 Down  L              2+             2+                 2+                        2+           2+                 Down    Coordination - Finger to Nose intact b/l. No tremors appreciated    Gait and station - Normal casual gait. Romberg (-)    Labs:                        15.4   10.07 )-----------( 171      ( 28 Jan 2023 08:46 )             46.0     01-26    140  |  107  |  12  ----------------------------<  142<H>  4.0   |  22  |  0.74    Ca    8.9      26 Jan 2023 23:38  Phos  2.7     01-26  Mg     2.2     01-26      CAPILLARY BLOOD GLUCOSE      POCT Blood Glucose.: 127 mg/dL (28 Jan 2023 08:31)          CSF:                  Radiology:  MR Head No Cont:  (27 Jan 2023 10:41)     Neurology - Consult Note    -  Spectra: [phone] ([facility]), [phone] ([facility])  -    HPI: Patient MARIELY CHENG is a 52y (1970) man with a PMHx significant for HTN, HLD, DM2, recent L ICA stent for ophthalmic aneurysm, who presents w/ CC of headache. He went to bed at midnight. Woke up at 3am w/ excruciating posterior headache. He started using his phone and noticed that he was having difficulty reading the letters at the ends of words. Never had a headache or visual problem like this before. Wears glasses. No numbness, weakness, tingling, or difficulty walking. Brother Bart is present at bedside for evaluation.    Review of Systems:    CONSTITUTIONAL: No fevers or chills  EYES AND ENT: +visual changes  NECK: No pain or stiffness  RESPIRATORY: No hemoptysis or shortness of breath  CARDIOVASCULAR: No chest pain or palpitations  GASTROINTESTINAL: No melena or hematochezia  GENITOURINARY: No dysuria or hematuria  NEUROLOGICAL: +As stated in HPI above  SKIN: No itching, burning, rashes, or lesions   All other review of systems is negative unless indicated above.    Allergies:      PMHx/PSHx/Family Hx: As above, otherwise see below   History of cerebral aneurysm    HTN (hypertension)    HLD (hyperlipidemia)    Asthma    Anxiety    2019 novel coronavirus disease (COVID-19)    Anxiety and depression    Ankle injury    DM2 (diabetes mellitus, type 2)    Fall        Social Hx:  Has brother  Father is  s/p ruptured aneurysm    Medications:  MEDICATIONS  (STANDING):    MEDICATIONS  (PRN):      Vitals:  T(C): 37.1 (23 @ 08:28), Max: 37.1 (23 @ 08:28)  HR: 92 (23 @ 08:28) (80 - 92)  BP: 168/100 (23 @ 08:28) (145/82 - 168/100)  RR: 20 (23 @ 08:28) (18 - 20)  SpO2: 98% (23 @ 08:28) (95% - 99%)    Physical Examination:   General - NAD  Cardiovascular - Peripheral pulses palpable, no edema  Eyes - Fundoscopy not performed due to safety precautions in the setting of the COVID-19 pandemic    Neurologic Exam:  Mental status - Awake, Alert, Oriented to person, place, and time. Speech fluent, repetition and naming intact. Follows simple and complex commands. Attention/concentration, recent and remote memory (including registration and recall), and fund of knowledge intact    Cranial nerves - PERRLA, blinks to threats b/l, VFF, subtle R-sided visual loss when reading examiner's name on white coat (only can see "emergen" of "emergency medicine"), EOMI, face sensation (V1-V3) intact b/l, facial strength intact without asymmetry b/l, hearing intact b/l, palate with symmetric elevation, trapezius 5/5 strength b/l, tongue midline on protrusion with full lateral movement    Motor - Normal bulk and tone throughout. No pronator drift.  Strength testing            Deltoid      Biceps      Triceps     Wrist Extension    Wrist Flexion     Interossei         R            5                 5               5                     5                              5                        5                 5  L             5                 5               5                     5                              5                        5                 5              Hip Flexion    Hip Extension    Knee Flexion    Knee Extension    Dorsiflexion    Plantar Flexion  R              5                           5                       5                           5                            5                          5  L              5                           5                        5                           5                            5                          5    Sensation - Light touch/temperature intact throughout    DTR's -             Biceps      Triceps     Brachioradialis      Patellar    Ankle    Toes/plantar response  R             2+             2+                  2+                       2+            2+                 Down  L              2+             2+                 2+                        2+           2+                 Down    Coordination - Finger to Nose intact b/l. No tremors appreciated    Gait and station - Normal casual gait    Labs:                        15.4   10.07 )-----------( 171      ( 2023 08:46 )             46.0         140  |  107  |  12  ----------------------------<  142<H>  4.0   |  22  |  0.74    Ca    8.9      2023 23:38  Phos  2.7       Mg     2.2           CAPILLARY BLOOD GLUCOSE      POCT Blood Glucose.: 127 mg/dL (2023 08:31)                  Radiology:  MR Head No Cont:  (2023 10:41)  No acute infarcts or hemorrhage. Signal dropout overlying the   left cavernous and supraclinoid internal carotid artery related to the   presence of stent. Good intracranial flow using noninvasive flow MR   angiography.

## 2023-01-28 NOTE — PROGRESS NOTE ADULT - ASSESSMENT
ASSESSMENT/PLAN: Patient with new onset left occipital IPH.     NEURO:   Neurochecks Q1H  Continue ASA, brilinta was lowered to 60 mg BID from 90 mg BID as was supratherapeutic (P2y12 8 post L ICA stenting)  No reversal   Interval CTH at 1 PM with stable heme, repeat scan at 9 PM tonight and tomorrow AM  No MRI needed  Activity: [] mobilize as tolerated [] Bedrest [] PT [] OT [] PMNR    PULM:  RA    CV:  SBP goal 100-160 mmHg  On Cardene, wean off and start on home meds as needed  CW home atorvastatin     RENAL:  Fluids: IVL    GI:  Diet: CCB  GI prophylaxis [x] not indicated [] PPI [] other:  Bowel regimen [x] miralax [x] senna [] other:    ENDO:   Goal euglycemia (-180)  ISS    HEME/ONC:  H/H stable  VTE prophylaxis: [x] SCDs [] chemoprophylaxis [x] hold chemoprophylaxis due to: recent heme PBD0 [] high risk of DVT/PE on admission due to:    ID:  Afebrile    MISC:    SOCIAL/FAMILY:  [x] awaiting [] updated at bedside [] family meeting    CODE STATUS:  [x] Full Code [] DNR [] DNI [] Palliative/Comfort Care    DISPOSITION:  [x] ICU [] Stroke Unit [] Floor [] EMU [] RCU [] PCU    [x] Patient is at high risk of neurologic deterioration/death due to: hematoma expansion, brain compression, herniation

## 2023-01-28 NOTE — PROGRESS NOTE ADULT - SUBJECTIVE AND OBJECTIVE BOX
SUMMARY: 52M PMHx HTN, DM, HLD, discharged on 1/27 after angio/pipeline embo of LICA for L ophth a aneurysm (elective) w/ Dr. Stewart which was performed on 1/26. Patient presented today new R VF deficit and new onset severe headache. CTH demonstrated left occipital heme. Was discharged on ASA/Brilinta 90 BID w/ supratherapeutic p2y12 (8 postop and 5 preop). CTA with good flow thro stent.     HOSPITAL COURSE: as per hpi    24 HOUR EVENTS: No events since admission. Patient denies any complaints.    ADMISSION SCORES:   GCS: 15    REVIEW OF SYSTEMS: Negative except as above    ALLERGIES: Allergies    No Known Allergies    Intolerances    ICU Vital Signs Last 24 Hrs  T(C): 37.1 (28 Jan 2023 08:28), Max: 37.1 (28 Jan 2023 08:28)  T(F): 98.7 (28 Jan 2023 08:28), Max: 98.7 (28 Jan 2023 08:28)  HR: 109 (28 Jan 2023 14:15) (75 - 121)  BP: 126/65 (28 Jan 2023 14:15) (126/65 - 189/107)  BP(mean): 89 (28 Jan 2023 14:15) (89 - 138)  ABP: --  ABP(mean): --  RR: 16 (28 Jan 2023 14:15) (16 - 29)  SpO2: 94% (28 Jan 2023 14:15) (85% - 98%)    O2 Parameters below as of 28 Jan 2023 09:51  Patient On (Oxygen Delivery Method): room air            VITALS/DATA/ORDERS: [x] Reviewed    DEVICES:   [] Restraints [x] PIVs [] ET tube [] central line [] PICC [] arterial line [] trammell [] NGT/OGT [] EVD [] LD [] CHARLEY/HMV [] LiCOX [] ICP monitor [] Trach [] PEG [] Chest Tube [] other:    I&O's Summary    28 Jan 2023 07:01  -  28 Jan 2023 14:20  --------------------------------------------------------  IN: 102.7 mL / OUT: 700 mL / NET: -597.3 mL    EXAMINATION  General: No acute distress  HEENT: Anicteric sclerae  Cardiac: [id number]  Lungs: Clear  Abdomen: Soft, non-tender, +BS  Extremities: No c/c/e  Skin/Incision Site: Clean, dry and intact  Neurologic: Awake, alert, fully oriented, follows commands, PERRL, RIGHT VF deficit, EOMI, face symmetric, tongue midline, no drift, full strength

## 2023-01-28 NOTE — H&P ADULT - ATTENDING COMMENTS
Patient s/p embolization of unruptured L ophthalmic artery aneurysm on 1/26/22 with two overlapping pipeline flex embolization devices with shield technology. Procedure was uncomplicated and he recovered well, with postop MRI showing no DWI hits whatsoever. Discharged home neuro intact the next day. This morning (1/28), around 4am he woke up with bad pain behind the left eye, and when reading things on his phone he noticed that the right side of the words he was reading was hard to see. He came to the ER and on exam has a R inferior quadrantanopsia. CT shows a moderate sized intraparenchymal hematoma in the left parieto-occipita lobe, CTA showed the stent to be patent. Ipsilateral IPH distant to the site of embolization is a known phenomenon post aneurysm treatment with flow diverting stent, and was a risk of treatment described in the consent. This risk is estimated to be 1-4% depending on the study. Luckily, the IPH in this case is not large, has already been stable on repeat imaging, and is not causing much brain compression. I suspect that we will be able to manage this conservatively. Will lower the brilinta dose to 60 bid since the PRU was <10, and monitor in the ICU for a few days. Will obtain new MRI prior to discharge.

## 2023-01-28 NOTE — PATIENT PROFILE ADULT - NSPROPTRIGHTREPPHONE_GEN_A_NUR
[id number] stay very well hydrated  follow up with Dr. Osorio,  return for fever chills or uncontrolled pain

## 2023-01-28 NOTE — H&P ADULT - NSHPLABSRESULTS_GEN_ALL_CORE
15.4   10.07 )-----------( 171      ( 28 Jan 2023 08:46 )             46.0     01-28    138  |  103  |  13  ----------------------------<  120<H>  4.1   |  24  |  0.75    Ca    9.4      28 Jan 2023 08:46  Phos  2.7     01-26  Mg     2.2     01-26    TPro  7.5  /  Alb  4.6  /  TBili  1.0  /  DBili  x   /  AST  14  /  ALT  15  /  AlkPhos  74  01-28

## 2023-01-28 NOTE — PHYSICAL THERAPY INITIAL EVALUATION ADULT - PERTINENT HX OF CURRENT PROBLEM, REHAB EVAL
PMHx significant for HTN, HLD, DM2, recent L ICA stent for ophthalmic aneurysm on 1/26 with Dr Stewart, who presents w/ CC of headache. He went to bed at midnight. Woke up at 3am w/ excruciating posterior headache. He started using his phone and noticed that he was having difficulty reading the letters at the ends of words. Never had a headache or visual problem like this before. Wears glasses. No numbness, weakness, tingling, or difficulty walking. Brother Bart is present at bedside for evaluation. CT head: acute L occipital IPH. CTA: unremarkable. Neurology consult impression:  acute R-sided visual loss and posterior headache. Found to have L occipital IPH. Likely related to recent initiation of dual anti-platelets to maintain patency of stent. Admitted to neurosurgery for close monitoring. PMHx significant for HTN, HLD, DM2, recent L ICA stent for ophthalmic aneurysm on 1/26 with Dr Stewart, who presents w/ CC of headache. He went to bed at midnight. Woke up at 3am w/ excruciating posterior headache. He started using his phone and noticed that he was having difficulty reading the letters at the ends of words. Never had a headache or visual problem like this before. Wears glasses. No numbness, weakness, tingling, or difficulty walking. Brother Bart is present at bedside for evaluation. CT head: acute L occipital IPH. CTA: unremarkable. Neurology consult impression:  acute R-sided visual loss and posterior headache. Found to have L occipital IPH. Likely related to recent initiation of dual anti-platelets to maintain patency of stent. Admitted to neurosurgery for close monitoring. CTH 1/29 No change since 11/28/2023 in the left occipital parenchymal hemorrhage.MRI 1/30 Left occipital hemorrhage new since 1/27/2023. No abnormal enhancement. Good intracranial flow using noninvasive flow MR angiography with signal dropout similar to the prior examination at the supraclinoid/cavernous internal carotid artery on the left due to a stent.

## 2023-01-28 NOTE — ED PROVIDER NOTE - CLINICAL SUMMARY MEDICAL DECISION MAKING FREE TEXT BOX
52-year-old male history of hypertension hyperlipidemia diabetes, ophthalmic aneurysm status post left ICA double pipeline 2 days ago here for difficulty seeing and headache.  Patient woke up at 3 AM with severe headache on the left side and reports that he has trouble seeing the tail end of the words.  When he went to bed last night around midnight he was completely normal.  Otherwise denies chest pain shortness of breath weakness in the arms or legs or any facial abnormalities.  Neurosurgeon Dr. Anderson Stewart.    On examination patient has difficulty seeing the second half of words with bilateral eyes.  No clear visual field deficits otherwise.  No facial droop.  Upper and lower extremities 5+ motor.  Sensory intact.  No dysmetria.    Patient 2 days out from ICA pipeline here for sudden onset headache and possible visual field deficit.  Concern for stent occlusion versus new stroke.  We will continue with stroke code.  Otherwise labs.  Will discuss care with neurosurgical team and neurology. Mirvaso Counseling: Mirvaso is a topical medication which can decrease superficial blood flow where applied. Side effects are uncommon and include stinging, redness and allergic reactions.

## 2023-01-28 NOTE — ED ADULT TRIAGE NOTE - CHIEF COMPLAINT QUOTE
s/p embolization Thursday, vision change at 0300 this morning, "can't read the last letter" s/p embolization left eye Thursday, vision change at 0300 this morning, "can't read the last letter" s/p embolization for left opthalmic aneurysm Thursday, vision change and headache at 0300 this morning, "can't read the last letter"

## 2023-01-28 NOTE — PHYSICAL THERAPY INITIAL EVALUATION ADULT - PLANNED THERAPY INTERVENTIONS, PT EVAL
GOAL: Pt will negotiate up/down steps with handrail independently in 2 weeks/gait training/strengthening

## 2023-01-28 NOTE — ED PROVIDER NOTE - NSICDXPASTMEDICALHX_GEN_ALL_CORE_FT
PAST MEDICAL HISTORY:  2019 novel coronavirus disease (COVID-19)     Ankle injury     Anxiety     Anxiety and depression     Asthma     DM2 (diabetes mellitus, type 2)     Fall     History of cerebral aneurysm     HLD (hyperlipidemia)     HTN (hypertension)

## 2023-01-29 LAB
ANION GAP SERPL CALC-SCNC: 12 MMOL/L — SIGNIFICANT CHANGE UP (ref 5–17)
BUN SERPL-MCNC: 15 MG/DL — SIGNIFICANT CHANGE UP (ref 7–23)
CALCIUM SERPL-MCNC: 9.3 MG/DL — SIGNIFICANT CHANGE UP (ref 8.4–10.5)
CHLORIDE SERPL-SCNC: 105 MMOL/L — SIGNIFICANT CHANGE UP (ref 96–108)
CO2 SERPL-SCNC: 21 MMOL/L — LOW (ref 22–31)
CREAT SERPL-MCNC: 0.86 MG/DL — SIGNIFICANT CHANGE UP (ref 0.5–1.3)
EGFR: 105 ML/MIN/1.73M2 — SIGNIFICANT CHANGE UP
GLUCOSE BLDC GLUCOMTR-MCNC: 110 MG/DL — HIGH (ref 70–99)
GLUCOSE BLDC GLUCOMTR-MCNC: 141 MG/DL — HIGH (ref 70–99)
GLUCOSE BLDC GLUCOMTR-MCNC: 163 MG/DL — HIGH (ref 70–99)
GLUCOSE BLDC GLUCOMTR-MCNC: 96 MG/DL — SIGNIFICANT CHANGE UP (ref 70–99)
GLUCOSE SERPL-MCNC: 142 MG/DL — HIGH (ref 70–99)
HCT VFR BLD CALC: 41.5 % — SIGNIFICANT CHANGE UP (ref 39–50)
HGB BLD-MCNC: 13.9 G/DL — SIGNIFICANT CHANGE UP (ref 13–17)
MAGNESIUM SERPL-MCNC: 2.1 MG/DL — SIGNIFICANT CHANGE UP (ref 1.6–2.6)
MCHC RBC-ENTMCNC: 30.8 PG — SIGNIFICANT CHANGE UP (ref 27–34)
MCHC RBC-ENTMCNC: 33.5 GM/DL — SIGNIFICANT CHANGE UP (ref 32–36)
MCV RBC AUTO: 92 FL — SIGNIFICANT CHANGE UP (ref 80–100)
NRBC # BLD: 0 /100 WBCS — SIGNIFICANT CHANGE UP (ref 0–0)
PA ADP PRP-ACNC: 56 PRU — LOW (ref 194–417)
PHOSPHATE SERPL-MCNC: 3.3 MG/DL — SIGNIFICANT CHANGE UP (ref 2.5–4.5)
PLATELET # BLD AUTO: 183 K/UL — SIGNIFICANT CHANGE UP (ref 150–400)
PLATELET RESPONSE ASPIRIN RESULT: 362 ARU — SIGNIFICANT CHANGE UP (ref 350–700)
POTASSIUM SERPL-MCNC: 3.5 MMOL/L — SIGNIFICANT CHANGE UP (ref 3.5–5.3)
POTASSIUM SERPL-SCNC: 3.5 MMOL/L — SIGNIFICANT CHANGE UP (ref 3.5–5.3)
RBC # BLD: 4.51 M/UL — SIGNIFICANT CHANGE UP (ref 4.2–5.8)
RBC # FLD: 12.1 % — SIGNIFICANT CHANGE UP (ref 10.3–14.5)
SODIUM SERPL-SCNC: 138 MMOL/L — SIGNIFICANT CHANGE UP (ref 135–145)
WBC # BLD: 9.87 K/UL — SIGNIFICANT CHANGE UP (ref 3.8–10.5)
WBC # FLD AUTO: 9.87 K/UL — SIGNIFICANT CHANGE UP (ref 3.8–10.5)

## 2023-01-29 PROCEDURE — 70450 CT HEAD/BRAIN W/O DYE: CPT | Mod: 26

## 2023-01-29 PROCEDURE — 99233 SBSQ HOSP IP/OBS HIGH 50: CPT

## 2023-01-29 RX ORDER — LOSARTAN POTASSIUM 100 MG/1
50 TABLET, FILM COATED ORAL DAILY
Refills: 0 | Status: DISCONTINUED | OUTPATIENT
Start: 2023-01-30 | End: 2023-01-31

## 2023-01-29 RX ORDER — LABETALOL HCL 100 MG
10 TABLET ORAL ONCE
Refills: 0 | Status: COMPLETED | OUTPATIENT
Start: 2023-01-29 | End: 2023-01-29

## 2023-01-29 RX ORDER — LEVETIRACETAM 250 MG/1
500 TABLET, FILM COATED ORAL
Refills: 0 | Status: DISCONTINUED | OUTPATIENT
Start: 2023-01-29 | End: 2023-01-31

## 2023-01-29 RX ORDER — LOSARTAN POTASSIUM 100 MG/1
25 TABLET, FILM COATED ORAL ONCE
Refills: 0 | Status: COMPLETED | OUTPATIENT
Start: 2023-01-29 | End: 2023-01-29

## 2023-01-29 RX ORDER — POTASSIUM CHLORIDE 20 MEQ
40 PACKET (EA) ORAL ONCE
Refills: 0 | Status: COMPLETED | OUTPATIENT
Start: 2023-01-29 | End: 2023-01-29

## 2023-01-29 RX ADMIN — Medication 1 DROP(S): at 13:40

## 2023-01-29 RX ADMIN — LOSARTAN POTASSIUM 25 MILLIGRAM(S): 100 TABLET, FILM COATED ORAL at 05:05

## 2023-01-29 RX ADMIN — ATORVASTATIN CALCIUM 40 MILLIGRAM(S): 80 TABLET, FILM COATED ORAL at 21:31

## 2023-01-29 RX ADMIN — Medication 81 MILLIGRAM(S): at 05:06

## 2023-01-29 RX ADMIN — Medication 2: at 13:38

## 2023-01-29 RX ADMIN — Medication 40 MILLIEQUIVALENT(S): at 02:35

## 2023-01-29 RX ADMIN — Medication 1 DROP(S): at 21:31

## 2023-01-29 RX ADMIN — Medication 1 DROP(S): at 18:18

## 2023-01-29 RX ADMIN — LEVETIRACETAM 500 MILLIGRAM(S): 250 TABLET, FILM COATED ORAL at 17:23

## 2023-01-29 RX ADMIN — LOSARTAN POTASSIUM 25 MILLIGRAM(S): 100 TABLET, FILM COATED ORAL at 13:38

## 2023-01-29 RX ADMIN — Medication 1 DROP(S): at 05:05

## 2023-01-29 RX ADMIN — TICAGRELOR 60 MILLIGRAM(S): 90 TABLET ORAL at 17:22

## 2023-01-29 RX ADMIN — Medication 650 MILLIGRAM(S): at 13:57

## 2023-01-29 RX ADMIN — TICAGRELOR 60 MILLIGRAM(S): 90 TABLET ORAL at 05:05

## 2023-01-29 RX ADMIN — Medication 650 MILLIGRAM(S): at 11:23

## 2023-01-29 RX ADMIN — Medication 10 MILLIGRAM(S): at 11:23

## 2023-01-29 NOTE — PROGRESS NOTE ADULT - ASSESSMENT
ASSESSMENT/PLAN: Patient with new onset left occipital IPH.     NEURO:   Neurochecks Q1H  Continue ASA, brilinta was lowered to 60 mg BID from 90 mg BID as was supratherapeutic (P2y12 8 post L ICA stenting)  No reversal   Interval CTH today AM  No MRI needed  ARu/PRU 5am  Activity: [] mobilize as tolerated [] Bedrest [] PT [] OT [] PMNR    PULM:  RA    CV:  SBP goal 100-160 mmHg  On Cardene, wean off and start on home meds as needed  CW home atorvastatin     RENAL:  Fluids: IVL    GI:  Diet: CCB  GI prophylaxis [x] not indicated [] PPI [] other:  Bowel regimen [x] miralax [x] senna [] other:    ENDO:   Goal euglycemia (-180)  ISS    HEME/ONC:  H/H stable  VTE prophylaxis: [x] SCDs [] chemoprophylaxis [x] hold chemoprophylaxis due to: recent heme PBD0 [] high risk of DVT/PE on admission due to:    ID:  Afebrile    MISC:    SOCIAL/FAMILY:  [x] awaiting [] updated at bedside [] family meeting    CODE STATUS:  [x] Full Code [] DNR [] DNI [] Palliative/Comfort Care    DISPOSITION:  [x] ICU [] Stroke Unit [] Floor [] EMU [] RCU [] PCU    [x] Patient is at high risk of neurologic deterioration/death due to: hematoma expansion, brain compression, herniation

## 2023-01-29 NOTE — PROGRESS NOTE ADULT - SUBJECTIVE AND OBJECTIVE BOX
SUMMARY: 52M PMHx HTN, DM, HLD, discharged on 1/27 after angio/pipeline embo of LICA for L ophth a aneurysm (elective) w/ Dr. Stewart which was performed on 1/26. Patient presented today new R VF deficit and new onset severe headache. CTH demonstrated left occipital heme. Was discharged on ASA/Brilinta 90 BID w/ supratherapeutic p2y12 (8 postop and 5 preop). CTA with good flow thro stent.     HOSPITAL COURSE: as per hpi      ADMISSION SCORES:   GCS: 15        24 HOUR EVENTS:   CTH stable, brillinta dose reduced per NSG                REVIEW OF SYSTEMS: Negative except as above    ICU Vital Signs Last 24 Hrs  Reviewed            VITALS/DATA/ORDERS: [x] Reviewed        EXAMINATION  General: No acute distress  HEENT: Anicteric sclerae  Cardiac: [id number]  Lungs: Clear  Abdomen: Soft, non-tender, +BS  Extremities: No c/c/e  Skin/Incision Site: Clean, dry and intact  Neurologic: Awake, alert, fully oriented, follows commands, PERRL, RIGHT VF deficit, EOMI, face symmetric, tongue midline, no drift, full strength

## 2023-01-29 NOTE — PROGRESS NOTE ADULT - ASSESSMENT
Plan:  - NSCU q1h neuro checks  - cont Brilinta 60 BID, [id number]  - fu ARU/PRU in AM  - CTH at 9AM for stability  - if CTH stable, possible downgrade to floor and monitor for another 24h  - no need mri at this time

## 2023-01-29 NOTE — PROGRESS NOTE ADULT - SUBJECTIVE AND OBJECTIVE BOX
Patient seen and examined at bedside.    --Anticoagulation--  aspirin enteric coated 81 milliGRAM(s) Oral <User Schedule>  ticagrelor 60 milliGRAM(s) Oral <User Schedule>    T(C): 36.7 (01-29-23 @ 04:00), Max: 37.1 (01-28-23 @ 08:28)  HR: 83 (01-29-23 @ 04:00) (75 - 121)  BP: 134/74 (01-29-23 @ 04:00) (113/59 - 189/107)  RR: 12 (01-29-23 @ 04:00) (12 - 29)  SpO2: 96% (01-29-23 @ 04:00) (85% - 98%)  Wt(kg): --    Exam:  subtle R VF deficit, otherwise intact    Labs:                        13.9   9.87  )-----------( 183      ( 29 Jan 2023 00:21 )             41.5     01-29    138  |  105  |  15  ----------------------------<  142<H>  3.5   |  21<L>  |  0.86    Ca    9.3      29 Jan 2023 00:21  Phos  3.3     01-29  Mg     2.1     01-29    TPro  7.5  /  Alb  4.6  /  TBili  1.0  /  DBili  x   /  AST  14  /  ALT  15  /  AlkPhos  74  01-28

## 2023-01-29 NOTE — OCCUPATIONAL THERAPY INITIAL EVALUATION ADULT - LIVES WITH, PROFILE
Addended by: KAREEN KENNY on: 6/5/2017 09:10 AM     Modules accepted: Taya Rodriguez    
in a house with 2 steps to enter/alone

## 2023-01-29 NOTE — PROGRESS NOTE ADULT - SUBJECTIVE AND OBJECTIVE BOX
[facility] NEUROCRITICAL CARE ATTENDING EVENING NOTE    BRIEF SUMMARY:  51yo man s/p L ICA pipeline for ophthalmic aneurysm treatment c/b R occipital IPH.     DAYTIME EVENTS:    VITALS/IMAGING/DATA/IVF FLUIDS/MEDICATIONS: [] Reviewed    ALLERGIES: Allergies    No Known Allergies    Intolerances        EXAMINATION:  General: No acute distress  HEENT: Anicteric sclerae  Cardiac: [id number]  Lungs: Clear  Abdomen: Soft, non-tender, +BS  Extremities: No c/c/e  Skin/Incision Site: Clean, dry and intact  Neurologic: Awake, alert, fully oriented, follows commands, PERRL, VFFtc, EOMI, face symmetric, tongue midline, no drift, full strength    ASSESSMENT:    PLAN:  Feeding: [] diet [] tube feeds  Analgesia/Sedation:   Seizure prophylaxis: [] not indicated  Thromboprophylaxis: [] SCDs [] chemoprophylaxis [] hold chemoprophylaxis due to: [] high risk of DVT/PE on admission due to:  Head of Bed/Activity: [] 30 degrees [] mobilize as tolerated [] PT [] OT [] PMNR  Ulcer prophylaxis: [] not indicated [] PPI [] other:  Glucose Control: Goal -180 [] RISS [] other:    [] Patient critically ill due to:    Time Seen:  Time Spent: __ [] critical care minutes    Contact: [phone] NEUROCRITICAL CARE ATTENDING EVENING NOTE    BRIEF SUMMARY:  51yo man s/p L ICA pipeline for ophthalmic aneurysm treatment c/b R occipital IPH.     DAYTIME EVENTS: stable exam     VITALS/IMAGING/DATA/IVF FLUIDS/MEDICATIONS: [x] Reviewed    ALLERGIES: Allergies    No Known Allergies    Intolerances    EXAMINATION:  Neurologic: Awake, alert, fully oriented, follows commands, PERRL, R VF deficit, face symmetric, tongue midline, no drift, full strength    ASSESSMENT: s/p ophthalmic aneurysm pipeline stent c/b occipital heme, now stable    PLAN:  cont ASA/Brilinta  MRI per neurosurgery  -160 cont losartan   Feeding: [x] diet [] tube feeds  Analgesia/Sedation: tylenol prn   Seizure prophylaxis: keppra bid   Thromboprophylaxis: [x] SCDs [] chemoprophylaxis [x] hold chemoprophylaxis due to: IPH [] high risk of DVT/PE on admission due to:  Head of Bed/Activity: [x] 30 degrees [x] mobilize as tolerated [] PT [] OT [] PMNR  Ulcer prophylaxis: [x] not indicated [] PPI [] other:  Glucose Control: Goal -180 [x] RISS [] other:   Jes Larios MD  06/28/18  [id number]

## 2023-01-29 NOTE — OCCUPATIONAL THERAPY INITIAL EVALUATION ADULT - PERTINENT HX OF CURRENT PROBLEM, REHAB EVAL
52M PMHx HTN, DM, HLD, dced yesterday after angio/PEDx2 of LICA for L oph a aneurysm (elective) w/ Dr. Stewart 1/26, p/w new R VF deficit and L occ heme on CTH. He did well post-proc, MR was dwi neg and MR NOVA w/ good flow. Was on ASA/Brilinta 90 BID w/ supratherapeutic p2y12 (8 postop and 5 preop). CTH w/ small 2.7cm L occ heme, CTA with good flow thro stent. Exam: AOx3, R inf VF deficit to finger counting, PERRL, EOMI, no facial, SANCHEZ 5/5.

## 2023-01-30 LAB
GLUCOSE BLDC GLUCOMTR-MCNC: 105 MG/DL — HIGH (ref 70–99)
GLUCOSE BLDC GLUCOMTR-MCNC: 112 MG/DL — HIGH (ref 70–99)
GLUCOSE BLDC GLUCOMTR-MCNC: 136 MG/DL — HIGH (ref 70–99)
GLUCOSE BLDC GLUCOMTR-MCNC: 88 MG/DL — SIGNIFICANT CHANGE UP (ref 70–99)

## 2023-01-30 PROCEDURE — 70546 MR ANGIOGRAPH HEAD W/O&W/DYE: CPT | Mod: 26,59

## 2023-01-30 PROCEDURE — 99233 SBSQ HOSP IP/OBS HIGH 50: CPT

## 2023-01-30 PROCEDURE — 70553 MRI BRAIN STEM W/O & W/DYE: CPT | Mod: 26

## 2023-01-30 RX ORDER — CHLORHEXIDINE GLUCONATE 213 G/1000ML
1 SOLUTION TOPICAL AT BEDTIME
Refills: 0 | Status: DISCONTINUED | OUTPATIENT
Start: 2023-01-30 | End: 2023-01-30

## 2023-01-30 RX ADMIN — TICAGRELOR 60 MILLIGRAM(S): 90 TABLET ORAL at 06:00

## 2023-01-30 RX ADMIN — Medication 650 MILLIGRAM(S): at 22:03

## 2023-01-30 RX ADMIN — LEVETIRACETAM 500 MILLIGRAM(S): 250 TABLET, FILM COATED ORAL at 17:32

## 2023-01-30 RX ADMIN — ATORVASTATIN CALCIUM 40 MILLIGRAM(S): 80 TABLET, FILM COATED ORAL at 22:03

## 2023-01-30 RX ADMIN — LEVETIRACETAM 500 MILLIGRAM(S): 250 TABLET, FILM COATED ORAL at 06:00

## 2023-01-30 RX ADMIN — Medication 1 DROP(S): at 06:00

## 2023-01-30 RX ADMIN — Medication 1 DROP(S): at 10:22

## 2023-01-30 RX ADMIN — LOSARTAN POTASSIUM 50 MILLIGRAM(S): 100 TABLET, FILM COATED ORAL at 06:00

## 2023-01-30 RX ADMIN — TICAGRELOR 60 MILLIGRAM(S): 90 TABLET ORAL at 17:32

## 2023-01-30 RX ADMIN — Medication 650 MILLIGRAM(S): at 22:23

## 2023-01-30 RX ADMIN — Medication 1 DROP(S): at 17:33

## 2023-01-30 RX ADMIN — Medication 1 DROP(S): at 02:16

## 2023-01-30 RX ADMIN — Medication 81 MILLIGRAM(S): at 06:00

## 2023-01-30 RX ADMIN — Medication 1 DROP(S): at 13:38

## 2023-01-30 NOTE — PROGRESS NOTE ADULT - SUBJECTIVE AND OBJECTIVE BOX
SUMMARY: 52M PMHx HTN, DM, HLD, discharged on 1/27 after angio/pipeline embo of LICA for L ophth a aneurysm (elective) w/ Dr. Stewart which was performed on 1/26. Patient presented today new R VF deficit and new onset severe headache. CTH demonstrated left occipital heme. Was discharged on ASA/Brilinta 90 BID w/ supratherapeutic p2y12 (8 postop and 5 preop). CTA with good flow thro stent.     HOSPITAL COURSE: as per hpi    1/30- No overnight events. MRI/MRA today.     ADMISSION SCORES:   GCS: 15        24 HOUR EVENTS:   CTH stable, brillinta dose reduced per NSG                REVIEW OF SYSTEMS: Negative except as above    ICU Vital Signs Last 24 Hrs  Reviewed            VITALS/DATA/ORDERS: [x] Reviewed        EXAMINATION  General: No acute distress  HEENT: Anicteric sclerae  Cardiac: [id number]  Lungs: Clear  Abdomen: Soft, non-tender, +BS  Extremities: No c/c/e  Skin/Incision Site: Clean, dry and intact  Neurologic: Awake, alert, fully oriented, follows commands, PERRL, RIGHT VF deficit, EOMI, face symmetric, tongue midline, no drift, full strength SUMMARY: 52M PMHx HTN, DM, HLD, discharged on 1/27 after angio/pipeline embo of LICA for L ophth a aneurysm (elective) w/ Dr. Stewart which was performed on 1/26. Patient presented today new R VF deficit and new onset severe headache. CTH demonstrated left occipital heme. Was discharged on ASA/Brilinta 90 BID w/ supratherapeutic p2y12 (8 postop and 5 preop). CTA with good flow thro stent.     HOSPITAL COURSE: as per hpi    1/30- No overnight events. MRI/MRA today.     ADMISSION SCORES:   GCS: 15        24 HOUR EVENTS:   CTH stable, brillinta dose reduced per NSG                REVIEW OF SYSTEMS: Negative except as above    ICU Vital Signs Last 24 Hrs  Reviewed            VITALS/DATA/ORDERS: [x] Reviewed        EXAMINATION  General: No acute distress  HEENT: Anicteric sclerae  Cardiac: [id number]  Lungs: Clear  Abdomen: Soft, non-tender, +BS  Extremities: No c/c/e  Skin/Incision Site: Clean, dry and intact  Neurologic: Awake, alert, fully oriented, follows commands, PERRL, RIGHT VF deficit towards the extreme peripheral, EOMI, face symmetric, tongue midline, no drift, full strength

## 2023-01-30 NOTE — PROGRESS NOTE ADULT - SUBJECTIVE AND OBJECTIVE BOX
Patient seen and examined at bedside.    --Anticoagulation--  aspirin enteric coated 81 milliGRAM(s) Oral <User Schedule>  ticagrelor 60 milliGRAM(s) Oral <User Schedule>    T(C): 37 (01-29-23 @ 19:00), Max: 37.1 (01-29-23 @ 15:26)  HR: 81 (01-29-23 @ 23:00) (80 - 102)  BP: 118/88 (01-29-23 @ 23:00) (118/88 - 174/98)  RR: 14 (01-29-23 @ 23:00) (12 - 33)  SpO2: 94% (01-29-23 @ 23:00) (92% - 98%)  Wt(kg): --    Exam:  AOx3, FC, PERRL, EOMI, no facial, 5/5 throughout, no drift

## 2023-01-30 NOTE — PROGRESS NOTE ADULT - ASSESSMENT
ASSESSMENT/PLAN: Patient with new onset left occipital IPH.     NEURO:   Neurochecks Q1H  Continue ASA, brilinta 60 mg BID   No reversal   MRI/MRA  Activity: [] mobilize as tolerated [] Bedrest [] PT [] OT [] PMNR    PULM:  RA    CV:  SBP goal 100-160 mmHg  On Cardene, wean off and start on home meds as needed  CW home atorvastatin     RENAL:  Fluids: IVL    GI:  Diet: CCB  GI prophylaxis [x] not indicated [] PPI [] other:  Bowel regimen [x] miralax [x] senna [] other:    ENDO:   Goal euglycemia (-180)  ISS    HEME/ONC:  H/H stable  VTE prophylaxis: [x] SCDs [] chemoprophylaxis [x] hold chemoprophylaxis due to: recent heme PBD0 [] high risk of DVT/PE on admission due to:    ID:  Afebrile    MISC:    SOCIAL/FAMILY:  [x] awaiting [] updated at bedside [] family meeting    CODE STATUS:  [x] Full Code [] DNR [] DNI [] Palliative/Comfort Care    DISPOSITION:  [x] ICU [] Stroke Unit [] Floor [] EMU [] RCU [] PCU    [x] Patient is at high risk of neurologic deterioration/death due to: hematoma expansion, brain compression, herniation   ASSESSMENT/PLAN: Patient with new onset left occipital IPH.     NEURO:   Neurochecks Q1H  Continue ASA, brilinta 60 mg BID   No reversal   MRI/MRA  Activity: [] mobilize as tolerated [] Bedrest [x] PT [x] OT [] PMNR    PULM:  RA    CV:  SBP goal 100-160 mmHg  CW home atorvastatin     RENAL:  Fluids: IVL    GI:  Diet: CCB  GI prophylaxis [x] not indicated [] PPI [] other:  Bowel regimen [x] miralax [x] senna [] other:    ENDO:   Goal euglycemia (-180)  ISS    HEME/ONC:  H/H stable  VTE prophylaxis: [x] SCDs [] chemoprophylaxis [x] hold chemoprophylaxis due to- WILL Follow up with neurosurgery regarding DVT ppx [] high risk of DVT/PE on admission due to:    ID:  Afebrile    MISC:    SOCIAL/FAMILY:  [x] awaiting [] updated at bedside [] family meeting    CODE STATUS:  [x] Full Code [] DNR [] DNI [] Palliative/Comfort Care    DISPOSITION:  [x] ICU [] Stroke Unit [] Floor [] EMU [] RCU [] PCU    [x] Patient is at high risk of neurologic deterioration/death due to: hematoma expansion, brain compression, herniation

## 2023-01-31 ENCOUNTER — TRANSCRIPTION ENCOUNTER (OUTPATIENT)
Age: 53
End: 2023-01-31

## 2023-01-31 VITALS
DIASTOLIC BLOOD PRESSURE: 76 MMHG | SYSTOLIC BLOOD PRESSURE: 109 MMHG | RESPIRATION RATE: 18 BRPM | OXYGEN SATURATION: 96 % | TEMPERATURE: 98 F | HEART RATE: 71 BPM

## 2023-01-31 DIAGNOSIS — I61.9 NONTRAUMATIC INTRACEREBRAL HEMORRHAGE, UNSPECIFIED: ICD-10-CM

## 2023-01-31 DIAGNOSIS — E78.5 HYPERLIPIDEMIA, UNSPECIFIED: ICD-10-CM

## 2023-01-31 DIAGNOSIS — E11.9 TYPE 2 DIABETES MELLITUS WITHOUT COMPLICATIONS: ICD-10-CM

## 2023-01-31 DIAGNOSIS — Z86.79 PERSONAL HISTORY OF OTHER DISEASES OF THE CIRCULATORY SYSTEM: ICD-10-CM

## 2023-01-31 LAB
ANION GAP SERPL CALC-SCNC: 10 MMOL/L — SIGNIFICANT CHANGE UP (ref 5–17)
BASOPHILS # BLD AUTO: 0.05 K/UL — SIGNIFICANT CHANGE UP (ref 0–0.2)
BASOPHILS NFR BLD AUTO: 0.7 % — SIGNIFICANT CHANGE UP (ref 0–2)
BUN SERPL-MCNC: 12 MG/DL — SIGNIFICANT CHANGE UP (ref 7–23)
CALCIUM SERPL-MCNC: 9.3 MG/DL — SIGNIFICANT CHANGE UP (ref 8.4–10.5)
CHLORIDE SERPL-SCNC: 106 MMOL/L — SIGNIFICANT CHANGE UP (ref 96–108)
CO2 SERPL-SCNC: 22 MMOL/L — SIGNIFICANT CHANGE UP (ref 22–31)
CREAT SERPL-MCNC: 0.76 MG/DL — SIGNIFICANT CHANGE UP (ref 0.5–1.3)
EGFR: 109 ML/MIN/1.73M2 — SIGNIFICANT CHANGE UP
EOSINOPHIL # BLD AUTO: 0.2 K/UL — SIGNIFICANT CHANGE UP (ref 0–0.5)
EOSINOPHIL NFR BLD AUTO: 3 % — SIGNIFICANT CHANGE UP (ref 0–6)
GLUCOSE BLDC GLUCOMTR-MCNC: 107 MG/DL — HIGH (ref 70–99)
GLUCOSE BLDC GLUCOMTR-MCNC: 108 MG/DL — HIGH (ref 70–99)
GLUCOSE SERPL-MCNC: 124 MG/DL — HIGH (ref 70–99)
HCT VFR BLD CALC: 43.7 % — SIGNIFICANT CHANGE UP (ref 39–50)
HGB BLD-MCNC: 14.8 G/DL — SIGNIFICANT CHANGE UP (ref 13–17)
IMM GRANULOCYTES NFR BLD AUTO: 0.6 % — SIGNIFICANT CHANGE UP (ref 0–0.9)
LYMPHOCYTES # BLD AUTO: 1.69 K/UL — SIGNIFICANT CHANGE UP (ref 1–3.3)
LYMPHOCYTES # BLD AUTO: 25.3 % — SIGNIFICANT CHANGE UP (ref 13–44)
MAGNESIUM SERPL-MCNC: 2.1 MG/DL — SIGNIFICANT CHANGE UP (ref 1.6–2.6)
MCHC RBC-ENTMCNC: 30.7 PG — SIGNIFICANT CHANGE UP (ref 27–34)
MCHC RBC-ENTMCNC: 33.9 GM/DL — SIGNIFICANT CHANGE UP (ref 32–36)
MCV RBC AUTO: 90.7 FL — SIGNIFICANT CHANGE UP (ref 80–100)
MONOCYTES # BLD AUTO: 0.55 K/UL — SIGNIFICANT CHANGE UP (ref 0–0.9)
MONOCYTES NFR BLD AUTO: 8.2 % — SIGNIFICANT CHANGE UP (ref 2–14)
NEUTROPHILS # BLD AUTO: 4.16 K/UL — SIGNIFICANT CHANGE UP (ref 1.8–7.4)
NEUTROPHILS NFR BLD AUTO: 62.2 % — SIGNIFICANT CHANGE UP (ref 43–77)
NRBC # BLD: 0 /100 WBCS — SIGNIFICANT CHANGE UP (ref 0–0)
PA ADP PRP-ACNC: 4 PRU — LOW (ref 194–417)
PHOSPHATE SERPL-MCNC: 3.4 MG/DL — SIGNIFICANT CHANGE UP (ref 2.5–4.5)
PLATELET # BLD AUTO: 201 K/UL — SIGNIFICANT CHANGE UP (ref 150–400)
POTASSIUM SERPL-MCNC: 3.9 MMOL/L — SIGNIFICANT CHANGE UP (ref 3.5–5.3)
POTASSIUM SERPL-SCNC: 3.9 MMOL/L — SIGNIFICANT CHANGE UP (ref 3.5–5.3)
RBC # BLD: 4.82 M/UL — SIGNIFICANT CHANGE UP (ref 4.2–5.8)
RBC # FLD: 12.2 % — SIGNIFICANT CHANGE UP (ref 10.3–14.5)
SODIUM SERPL-SCNC: 138 MMOL/L — SIGNIFICANT CHANGE UP (ref 135–145)
WBC # BLD: 6.69 K/UL — SIGNIFICANT CHANGE UP (ref 3.8–10.5)
WBC # FLD AUTO: 6.69 K/UL — SIGNIFICANT CHANGE UP (ref 3.8–10.5)

## 2023-01-31 PROCEDURE — 82435 ASSAY OF BLOOD CHLORIDE: CPT

## 2023-01-31 PROCEDURE — 85027 COMPLETE CBC AUTOMATED: CPT

## 2023-01-31 PROCEDURE — 85576 BLOOD PLATELET AGGREGATION: CPT

## 2023-01-31 PROCEDURE — 97165 OT EVAL LOW COMPLEX 30 MIN: CPT

## 2023-01-31 PROCEDURE — 82947 ASSAY GLUCOSE BLOOD QUANT: CPT

## 2023-01-31 PROCEDURE — 99285 EMERGENCY DEPT VISIT HI MDM: CPT

## 2023-01-31 PROCEDURE — 85018 HEMOGLOBIN: CPT

## 2023-01-31 PROCEDURE — 84295 ASSAY OF SERUM SODIUM: CPT

## 2023-01-31 PROCEDURE — 82962 GLUCOSE BLOOD TEST: CPT

## 2023-01-31 PROCEDURE — 70450 CT HEAD/BRAIN W/O DYE: CPT

## 2023-01-31 PROCEDURE — 70553 MRI BRAIN STEM W/O & W/DYE: CPT

## 2023-01-31 PROCEDURE — 87637 SARSCOV2&INF A&B&RSV AMP PRB: CPT

## 2023-01-31 PROCEDURE — 36415 COLL VENOUS BLD VENIPUNCTURE: CPT

## 2023-01-31 PROCEDURE — 97162 PT EVAL MOD COMPLEX 30 MIN: CPT

## 2023-01-31 PROCEDURE — 84484 ASSAY OF TROPONIN QUANT: CPT

## 2023-01-31 PROCEDURE — 85730 THROMBOPLASTIN TIME PARTIAL: CPT

## 2023-01-31 PROCEDURE — 85025 COMPLETE CBC W/AUTO DIFF WBC: CPT

## 2023-01-31 PROCEDURE — 96374 THER/PROPH/DIAG INJ IV PUSH: CPT

## 2023-01-31 PROCEDURE — 80053 COMPREHEN METABOLIC PANEL: CPT

## 2023-01-31 PROCEDURE — 84132 ASSAY OF SERUM POTASSIUM: CPT

## 2023-01-31 PROCEDURE — 85610 PROTHROMBIN TIME: CPT

## 2023-01-31 PROCEDURE — 82565 ASSAY OF CREATININE: CPT

## 2023-01-31 PROCEDURE — 85014 HEMATOCRIT: CPT

## 2023-01-31 PROCEDURE — 70546 MR ANGIOGRAPH HEAD W/O&W/DYE: CPT

## 2023-01-31 PROCEDURE — 82330 ASSAY OF CALCIUM: CPT

## 2023-01-31 PROCEDURE — 82803 BLOOD GASES ANY COMBINATION: CPT

## 2023-01-31 PROCEDURE — 70496 CT ANGIOGRAPHY HEAD: CPT | Mod: MA

## 2023-01-31 PROCEDURE — A9585: CPT

## 2023-01-31 PROCEDURE — 99233 SBSQ HOSP IP/OBS HIGH 50: CPT

## 2023-01-31 PROCEDURE — 83735 ASSAY OF MAGNESIUM: CPT

## 2023-01-31 PROCEDURE — 80048 BASIC METABOLIC PNL TOTAL CA: CPT

## 2023-01-31 PROCEDURE — 84100 ASSAY OF PHOSPHORUS: CPT

## 2023-01-31 PROCEDURE — 70498 CT ANGIOGRAPHY NECK: CPT | Mod: MA

## 2023-01-31 PROCEDURE — 83605 ASSAY OF LACTIC ACID: CPT

## 2023-01-31 RX ORDER — LOSARTAN POTASSIUM 100 MG/1
1 TABLET, FILM COATED ORAL
Qty: 0 | Refills: 0 | DISCHARGE
Start: 2023-01-31

## 2023-01-31 RX ORDER — ASPIRIN/CALCIUM CARB/MAGNESIUM 324 MG
1 TABLET ORAL
Qty: 0 | Refills: 0 | DISCHARGE

## 2023-01-31 RX ORDER — ALBUTEROL 90 UG/1
2 AEROSOL, METERED ORAL
Qty: 0 | Refills: 0 | DISCHARGE

## 2023-01-31 RX ORDER — LOSARTAN POTASSIUM 100 MG/1
1 TABLET, FILM COATED ORAL
Qty: 0 | Refills: 0 | DISCHARGE

## 2023-01-31 RX ORDER — LEVETIRACETAM 250 MG/1
1 TABLET, FILM COATED ORAL
Qty: 40 | Refills: 0
Start: 2023-01-31 | End: 2023-02-19

## 2023-01-31 RX ORDER — ASPIRIN/CALCIUM CARB/MAGNESIUM 324 MG
1 TABLET ORAL
Qty: 0 | Refills: 0 | DISCHARGE
Start: 2023-01-31

## 2023-01-31 RX ORDER — ACETAMINOPHEN 500 MG
2 TABLET ORAL
Qty: 0 | Refills: 0 | DISCHARGE
Start: 2023-01-31

## 2023-01-31 RX ORDER — TICAGRELOR 90 MG/1
1 TABLET ORAL
Qty: 0 | Refills: 0 | DISCHARGE

## 2023-01-31 RX ORDER — TICAGRELOR 90 MG/1
1 TABLET ORAL
Qty: 60 | Refills: 0
Start: 2023-01-31 | End: 2023-03-01

## 2023-01-31 RX ORDER — ALBUTEROL 90 UG/1
2 AEROSOL, METERED ORAL
Qty: 0 | Refills: 0 | DISCHARGE
Start: 2023-01-31

## 2023-01-31 RX ADMIN — LEVETIRACETAM 500 MILLIGRAM(S): 250 TABLET, FILM COATED ORAL at 05:56

## 2023-01-31 RX ADMIN — TICAGRELOR 60 MILLIGRAM(S): 90 TABLET ORAL at 05:57

## 2023-01-31 RX ADMIN — Medication 81 MILLIGRAM(S): at 05:56

## 2023-01-31 RX ADMIN — Medication 1 DROP(S): at 09:34

## 2023-01-31 RX ADMIN — LOSARTAN POTASSIUM 50 MILLIGRAM(S): 100 TABLET, FILM COATED ORAL at 05:56

## 2023-01-31 RX ADMIN — Medication 1 DROP(S): at 06:40

## 2023-01-31 NOTE — DISCHARGE NOTE PROVIDER - NSDCMRMEDTOKEN_GEN_ALL_CORE_FT
acetaminophen 325 mg oral tablet: 2 tab(s) orally every 6 hours, As needed, Mild Pain (1 - 3)  albuterol 90 mcg/inh inhalation aerosol: 2 puff(s) inhaled every 6 hours, As needed, Shortness of Breath and/or Wheezing  aspirin 81 mg oral delayed release tablet: 1 tab(s) orally once a day  atorvastatin 40 mg oral tablet: 1 tab(s) orally once a day  Brilinta (ticagrelor) 60 mg oral tablet: 1 tab(s) orally 2 times a day   levETIRAcetam 500 mg oral tablet: 1 tab(s) orally 2 times a day  losartan 50 mg oral tablet: 1 tab(s) orally once a day  metFORMIN 500 mg oral tablet: 1 tab(s) orally once a day--evening  Omega-3 1000 mg oral capsule: 2 cap(s) orally once a day  Outpatient physical and occupational therapy : 3 times a week for 4 weeks     start after office visit with Dr. Stewart   s/p brain aneurysm stent placement  left occipital hemorrhage

## 2023-01-31 NOTE — DISCHARGE NOTE PROVIDER - HOSPITAL COURSE
Patient came in thought the ED with right visual field deficit. Patient had a ct head that showed left occipital small hemorrhage. Patient was admitted to icu and was monitored. Patient had interval ct head ad it was stable. Patient had recent left ica pipeline stent for left opthalmic artery aneurysm on 1/26 so patient was restarted on aspirin and brilinta. Patient had a ct head on 1/29 and mr brain with nova on 1/30 and both were stable. Patient was seen by physical and occupational therapy and was recommended for outpatient pt/ot. Patient had aspirin assay and p2y12 done and both were therapeutic. Patient was discharged home with follow up instructions . Patient was neurologically and medically stable for discharge to home.

## 2023-01-31 NOTE — DISCHARGE NOTE NURSING/CASE MANAGEMENT/SOCIAL WORK - NSDCPEFALRISK_GEN_ALL_CORE
For information on Fall & Injury Prevention, visit: [email or web address] OR  [email or web address] OR  https://www.cdc.gov/steadi/patient.html

## 2023-01-31 NOTE — DISCHARGE NOTE PROVIDER - NSDCFUSCHEDAPPT_GEN_ALL_CORE_FT
Anderson Stewart  [facility]ician [facility]  NEUROSURG [address] [place] [address]  Scheduled Appointment: 02/15/2023

## 2023-01-31 NOTE — PROGRESS NOTE ADULT - NSPROGADDITIONALINFOA_GEN_ALL_CORE
36 minutes spent. more than 50 percent of time was spent on examining patient, reviewed labs images and spoke plan with Dr. Stewart regarding lab result, prepared discharge summary and gave discharge instructions.

## 2023-01-31 NOTE — DISCHARGE NOTE NURSING/CASE MANAGEMENT/SOCIAL WORK - PATIENT PORTAL LINK FT
You can access the FollowMyHealth Patient Portal offered by [facility] by registering at the following website: [email or web address]. By joining [employer]’s FollowMyHealth portal, you will also be able to view your health information using other applications (apps) compatible with our system.

## 2023-01-31 NOTE — PROGRESS NOTE ADULT - SUBJECTIVE AND OBJECTIVE BOX
SUBJECTIVE:   Patient was seen and evaluated at bedside. Patient is resting in bed and is in no new acute distress.   OVERNIGHT EVENTS:   none   Vital Signs Last 24 Hrs  T(C): 36.7 (31 Jan 2023 09:36), Max: 37.2 (30 Jan 2023 18:17)  T(F): 98 (31 Jan 2023 09:36), Max: 99 (30 Jan 2023 18:17)  HR: 71 (31 Jan 2023 09:36) (71 - 96)  BP: 109/76 (31 Jan 2023 09:36) (109/68 - 142/89)  BP(mean): --  RR: 18 (31 Jan 2023 09:36) (18 - 18)  SpO2: 96% (31 Jan 2023 09:36) (95% - 98%)    Parameters below as of 31 Jan 2023 04:47  Patient On (Oxygen Delivery Method): room air        PHYSICAL EXAM:    General: No Acute Distress     Neurological: Awake, alert oriented to person, place and time, Following Commands, PERRL, EOMI, Face Symmetrical, Speech Fluent, Moving all extremities, Muscle Strength normal in all four extremities, No Drift, Sensation to Light Touch Intact    Pulmonary: Clear to Auscultation, No Rales, No Rhonchi, No Wheezes     Cardiovascular: S1, S2, Regular Rate and Rhythm     Gastrointestinal: Soft, Nontender, Nondistended     Incision:     LABS:                        14.8   6.69  )-----------( 201      ( 31 Jan 2023 06:02 )             43.7    01-31    138  |  106  |  12  ----------------------------<  124<H>  3.9   |  22  |  0.76    Ca    9.3      31 Jan 2023 06:02  Phos  3.4     01-31  Mg     2.1     01-31 01-30 @ 07:01  -  01-31 @ 07:00  --------------------------------------------------------  IN: 1140 mL / OUT: 1100 mL / NET: 40 mL      DRAINS:     MEDICATIONS:  Antibiotics:    Neuro:  acetaminophen     Tablet .. 650 milliGRAM(s) Oral every 6 hours PRN Temp greater or equal to 38C (100.4F), Mild Pain (1 - 3)  levETIRAcetam 500 milliGRAM(s) Oral two times a day    Cardiac:  losartan 50 milliGRAM(s) Oral daily    Pulm:  albuterol    90 MICROgram(s) HFA Inhaler 2 Puff(s) Inhalation every 6 hours PRN Shortness of Breath and/or Wheezing    GI/:    Other:   artificial tears (preservative free) Ophthalmic Solution 1 Drop(s) Both EYES every 4 hours  aspirin enteric coated 81 milliGRAM(s) Oral <User Schedule>  atorvastatin 40 milliGRAM(s) Oral at bedtime  dextrose 5%. 1000 milliLiter(s) IV Continuous <Continuous>  dextrose 5%. 1000 milliLiter(s) IV Continuous <Continuous>  dextrose 50% Injectable 25 Gram(s) IV Push once  dextrose 50% Injectable 12.5 Gram(s) IV Push once  dextrose 50% Injectable 25 Gram(s) IV Push once  dextrose Oral Gel 15 Gram(s) Oral once PRN Blood Glucose LESS THAN 70 milliGRAM(s)/deciliter  glucagon  Injectable 1 milliGRAM(s) IntraMuscular once  insulin lispro (ADMELOG) corrective regimen sliding scale   SubCutaneous three times a day before meals  insulin lispro (ADMELOG) corrective regimen sliding scale   SubCutaneous at bedtime  ticagrelor 60 milliGRAM(s) Oral <User Schedule>    DIET: [] Regular [] CCD [] Renal [] Puree [] Dysphagia [] Tube Feeds:   ccd diet   IMAGING:   ACC: [id number] EXAM:  CT BRAIN   ORDERED BY: AVELINO REECE     PROCEDURE DATE:  01/29/2023          INTERPRETATION:  CLINICAL INDICATION: Follow-up left occipital hemorrhage    5mm axial sections of the brain were obtained from base to vertex,   without the intravenous administration of contrast material. Coronal and   sagittal computer generated reconstructed views are available.    Comparison is made with the prior CT of 1/28/2023 9:17 PM.    No significant interval change is identified.    There is hemorrhage in the left occipital lobe perifocal edema mild local   mass effect. Ventricles and sulci are normal in position for the   patient's age. The left supraclinoid stent is unchanged.          IMPRESSION: No change since 11/28/2023 in the left occipital parenchymal   hemorrhage..    --- End of Report ---            FREDDY TINAJERO MD; Attending Radiologist  This document has been electronically signed. Jan 29 2023 10:03AM  ACC: [id number] EXAM:  MR BRAIN WAW IC   ORDERED BY: RICARDO ALMEIDA     ACC: [id number] EXAM:  MR ANGIO BRAIN WAW IC   ORDERED BY: RICARDO ALMEIDA     PROCEDURE DATE:  01/30/2023          INTERPRETATION:  Clinical indication: Left occipital hemorrhage, stent in   the left supraclinoid internal carotid artery.      Magnetic resonance imaging of the brain was carried out with transaxial   SPGR, FLAIR, fast spin echo T2 weighted images, axial susceptibility   weighted series, diffusion weighted series and sagittal T1 weighted   series on a 1.5 Anayeli magnet. Post contrast axial, coronal and sagittal   T1 weighted images were obtained. 8.5 cc of Gadavist were intravenously   injected, 1.5 cc were discarded.    Comparison is made with the prior MRI/MRA of 1/27/2023 and CTA 1/28/2023.    There is a 2 cm recent appearing hemorrhage in the left occipital lobe a   small amount of vasogenic edema which is new since 1/27/2023. After   contrast administration there is no significant contrast enhancement.   Ventricles and sulci are normal for the patient's age. After contrast   administration there is normal intracranial vascular enhancement.      The sellar and parasellar structures are unremarkable.      3-D axial noncontrast MRA were performed on the cervical and intracranial   vessels, respectively. Intravascular flow quantification was performed   using gated 2D phase contrast MR, imaged perpendicular to the vessel   axis.  Images were post processed NOVA software and a NOVA flow study   report is available. MRA postcontrast was also obtained.    There is good flow through the left supraclinoid stent compared with   1/27/2023 with similar signal dropout overlying the cavernous portion due   to the presence of the metallic stent. There is a normal postcontrast   enhancement to the intracranial arterial structures.    Flow is as follows in milliliters per minute.    VINOD 129, RMCA 147, RACA2 71  compared with 1/27/2023  VINOD 167, RMCA 132, RACA2 79    LICA 308, LMCA 171, LACA 167, LACA2 65  compared with 1/27/2023  LICA 355, LMCA 174, LACA 163, LACA2 71.    , LVA 72, , RPCA 64, LPCA 66  compared with 1/27/2023  , LVA 94, , RPCA 80, LPCA 80    IMPRESSION: Left occipital hemorrhage new since 1/27/2023. No abnormal   enhancement. Good intracranial flow using noninvasive flow MR angiography   with signal dropout similar to the prior examination at the   supraclinoid/cavernous internal carotid artery on the left due to a stent.    --- End of Report ---            FREDDY TINAJERO MD; Attending Radiologist  This document has been electronically signed. Jan 30 2023  1:54PM

## 2023-01-31 NOTE — PROGRESS NOTE ADULT - ASSESSMENT
HPI:  52M PMHx HTN, DM, HLD, dced yest after angio/PEDx2 of LICA for L oph a aneurysm (elective) w/ Dr. Stewart 1/26, p/w new R VF deficit and L occ heme on CTH. He did well post-proc, MR was dwi neg and MR NOVA w/ good flow. Was on ASA/Brilinta 90 BID w/ therapeutic p2y12 (8 postop and 5 preop). CTH w/ small 2.7cm L occ heme, CTA with good flow through stent. Exam: AOx3, R inf VF deficit to finger counting, PERRL, EOMI, no facial, SANCHEZ 5/5 (28 Jan 2023 10:07)    PROCEDURE:  none       PLAN:  1 No surgical intervention needed   2 follow up p2y12   3 continue aspirin and brilinta   4 keppra to prevent seizure   5 room air   5 bp stable   6 dm- will continue on home dose metformin upon dc   7 ccd diet   8 dvt ppx scds   9 afebrile   10 dispo - home today     -will discuss with Dr. Stewart   -spectra [id number]

## 2023-01-31 NOTE — DISCHARGE NOTE PROVIDER - CARE PROVIDER_API CALL
Anderson Stewart (MD; MS)  Unallocated  [address] [facility], [address] [place] [address]  Phone: [phone]  Fax: [phone]  Follow Up Time: 1 week

## 2023-01-31 NOTE — DISCHARGE NOTE PROVIDER - NSDCCPCAREPLAN_GEN_ALL_CORE_FT
PRINCIPAL DISCHARGE DIAGNOSIS  Diagnosis: Cerebrovascular accident with intracranial hemorrhage  Assessment and Plan of Treatment: left occipital hemorrhage- no surgical intervention required.      SECONDARY DISCHARGE DIAGNOSES  Diagnosis: Brain aneurysm  Assessment and Plan of Treatment: s/p left ICA pipeline stent placement for left opthalmic artery aneurysm. 1/26

## 2023-01-31 NOTE — DISCHARGE NOTE PROVIDER - NSDCFUADDINST_GEN_ALL_CORE_FT
-May take shower.   -If notice any new weakness, severe headache, seizure, fever , blurry vision then please contact  or come back to emergency room.   You are started on aspirin.  Aspirin helps thin your blood.  Side effects of aspirin: brusing, easy bleeding abdominal pain  ?You are started on brilinta.  Brilinta helps thin your blood.   Side effects of brilinta: brusing, easy bleeding diarrhea.

## 2023-02-08 ENCOUNTER — NON-APPOINTMENT (OUTPATIENT)
Age: 53
End: 2023-02-08

## 2023-02-08 ENCOUNTER — OUTPATIENT (OUTPATIENT)
Dept: OUTPATIENT SERVICES | Facility: HOSPITAL | Age: 53
LOS: 1 days | End: 2023-02-08
Payer: MEDICAID

## 2023-02-08 ENCOUNTER — APPOINTMENT (OUTPATIENT)
Dept: NEUROSURGERY | Facility: CLINIC | Age: 53
End: 2023-02-08
Payer: MEDICAID

## 2023-02-08 ENCOUNTER — APPOINTMENT (OUTPATIENT)
Dept: CT IMAGING | Facility: CLINIC | Age: 53
End: 2023-02-08
Payer: MEDICAID

## 2023-02-08 VITALS
DIASTOLIC BLOOD PRESSURE: 84 MMHG | WEIGHT: 176 LBS | OXYGEN SATURATION: 97 % | HEART RATE: 83 BPM | BODY MASS INDEX: 27.62 KG/M2 | HEIGHT: 67 IN | SYSTOLIC BLOOD PRESSURE: 126 MMHG | TEMPERATURE: 98.2 F

## 2023-02-08 DIAGNOSIS — J45.909 UNSPECIFIED ASTHMA, UNCOMPLICATED: ICD-10-CM

## 2023-02-08 DIAGNOSIS — I10 ESSENTIAL (PRIMARY) HYPERTENSION: ICD-10-CM

## 2023-02-08 DIAGNOSIS — F41.9 ANXIETY DISORDER, UNSPECIFIED: ICD-10-CM

## 2023-02-08 DIAGNOSIS — E78.5 HYPERLIPIDEMIA, UNSPECIFIED: ICD-10-CM

## 2023-02-08 DIAGNOSIS — Z98.890 OTHER SPECIFIED POSTPROCEDURAL STATES: Chronic | ICD-10-CM

## 2023-02-08 DIAGNOSIS — I67.1 CEREBRAL ANEURYSM, NONRUPTURED: ICD-10-CM

## 2023-02-08 DIAGNOSIS — Z86.39 PERSONAL HISTORY OF OTHER ENDOCRINE, NUTRITIONAL AND METABOLIC DISEASE: ICD-10-CM

## 2023-02-08 PROCEDURE — 70450 CT HEAD/BRAIN W/O DYE: CPT

## 2023-02-08 PROCEDURE — 99215 OFFICE O/P EST HI 40 MIN: CPT

## 2023-02-08 PROCEDURE — 70450 CT HEAD/BRAIN W/O DYE: CPT | Mod: 26

## 2023-02-08 RX ORDER — LAMOTRIGINE 150 MG/1
TABLET ORAL
Refills: 0 | Status: DISCONTINUED | COMMUNITY
End: 2023-02-08

## 2023-02-08 RX ORDER — DICLOFENAC POTASSIUM 50 MG/1
TABLET, COATED ORAL
Refills: 0 | Status: ACTIVE | COMMUNITY

## 2023-02-08 RX ORDER — TICAGRELOR 90 MG/1
90 TABLET ORAL TWICE DAILY
Qty: 60 | Refills: 3 | Status: DISCONTINUED | COMMUNITY
Start: 2022-12-27 | End: 2023-02-08

## 2023-02-08 NOTE — REASON FOR VISIT
[Follow-Up: _____] : a [unfilled] follow-up visit [Family Member] : family member [FreeTextEntry1] : s/p Cerebral angiogram and embolization of unruptured left ophthalmic aneurysm with pipeline stent 1/26/23\par \par s/p Diagnostic cerebral angiogram 10/27/22

## 2023-02-08 NOTE — HISTORY OF PRESENT ILLNESS
[FreeTextEntry1] : MARIELY CHENG is a 52 year old male with PMH of HTN, HLD, DM 2 on metformin, no insulin, asthma, anxiety. Per patient report, father passed away from ruptured cerebral aneurysm in July 2022 at [facility]. His brother is a patient of ours as well who has history of cavernous aneurysm. PCP, Dr. Jarret Fishman ordered brain imaging due to extensive family history. He underwent MRA brain non con at [facility] on 10/6/22 which reads 7mm focal fusiform aneurysm involving the left internal carotid artery at the ophthalmic artery origin. He is a former smoker of cigarettes, quit more than 5-7 years ago. He states he has occasional transient head pressure/cloudiness that he has had for a while for a couple years. On 10/27/22, he underwent diagnostic catheter cerebral angiogram demonstrating 5.6mm left ICA ophthalmic artery aneurysm. He was started on aspirin 81mg daily and Brilinta 90mg BID. On 1/26/23, he underwent successful embolization of unruptured L ophthalmic aneurysm with flow diversion using overlapping 4.5 x 14mm and 4.75 x 12mm pipeline stents. Discharged in stable condition on 1/27/23. \par \par On 1/28/23, he returned to [facility] ED with right visual field deficit. CTH showed a small left occipital hemorrhage and was admitted to NSCU for monitoring. Discharged on aspirin 81mg, brilinta 60mg BID, and Keppra 500mg BID. \par \par Today, he is doing overall well. States his vision and headaches are improving. Denies other symptoms of motor, sensory, or speech abnormalities.

## 2023-02-08 NOTE — ASSESSMENT
How Severe Are The Lesions?: mild [FreeTextEntry1] : IMPRESSION:\par 52M with PMH of HTN, HLD, DM 2 on metformin, asthma, and anxiety. Positive family history of ruptured cerebral aneurysm, father passed July 2022, brother with cavernous aneurysm. PCP, Dr. Jarret Fishman ordered MRA brain non con done 10/6 at [facility] demonstrating 7mm left ICA ophthalmic aneurysm. Former smoker of cigarettes, quit more than 5-7 years ago. s/p successful embo of unruptured L ophthalmic aneurysm 1/26/23 with 2 overlapping pipeline stents. Pt did well post-procedure, post-op MR JEFFREY with good flow, on ASA/Brilinta 90 BID, discharged home. Represented to ED on 1/28/23 with R VF deficit, CTH with small 2.7mm L occipital hemorrhage, CTA with good flow through stent. Brilinta lowered to 60mg BID, continued on ASA 81, and Keppra 500mg BID for seizure ppx, multiple scans demonstrated stability.\par \par We discussed that ipsilateral IPH distant to the site of embolization is a known phenomenon post aneurysm treatment with flow diverting stent, and was a risk of treatment described in the consent. This risk is estimated to be 1-4% depending on the study. Luckily, the IPH in this case is not large, has already been stable on repeat imaging, and is not causing much brain compression. I suspect that we will be able to manage this conservatively. Lowered the brilinta dose to 60 bid.\par \par Patient doing clinically well, reports improvement in head pressure and vision. \par Repeat CT scan 2/8/23 to evaluate hemorrhage shows decreasing density to the left occipital parenchymal hemorrhage with slightly increased vasogenic edema compared with 1/28/23.\par \par \par \par PLAN: \par Continue ASA 81 and Brilinta 60mg BID\par Follow up phone call to touch base in 2 weeks\par MRI brain non con, MRA brain non con NOVA 3 months post-embo – end of April 2023\par Follow up in outpatient office after to discuss results\par Patient has follow up with neuro-ophthalmology, Dr. Hernandez next week\par 6 month repeat cerebral angiogram - end of July 2023\par  Is This A New Presentation, Or A Follow-Up?: Follow Up 5-Fluorouracil Therapy How Many Weeks Of 5-Fu Have You Completed?: 21

## 2023-02-09 ENCOUNTER — NON-APPOINTMENT (OUTPATIENT)
Age: 53
End: 2023-02-09

## 2023-02-14 ENCOUNTER — NON-APPOINTMENT (OUTPATIENT)
Age: 53
End: 2023-02-14

## 2023-02-14 ENCOUNTER — APPOINTMENT (OUTPATIENT)
Dept: OPHTHALMOLOGY | Facility: CLINIC | Age: 53
End: 2023-02-14
Payer: MEDICAID

## 2023-02-14 PROCEDURE — 92083 EXTENDED VISUAL FIELD XM: CPT

## 2023-02-14 PROCEDURE — 92015 DETERMINE REFRACTIVE STATE: CPT | Mod: NC

## 2023-02-14 PROCEDURE — 99204 OFFICE O/P NEW MOD 45 MIN: CPT

## 2023-02-14 PROCEDURE — 92133 CPTRZD OPH DX IMG PST SGM ON: CPT

## 2023-02-22 ENCOUNTER — APPOINTMENT (OUTPATIENT)
Dept: NEUROSURGERY | Facility: CLINIC | Age: 53
End: 2023-02-22
Payer: MEDICAID

## 2023-02-22 PROCEDURE — 99442: CPT

## 2023-04-27 ENCOUNTER — APPOINTMENT (OUTPATIENT)
Dept: MRI IMAGING | Facility: HOSPITAL | Age: 53
End: 2023-04-27

## 2023-04-27 ENCOUNTER — OUTPATIENT (OUTPATIENT)
Dept: OUTPATIENT SERVICES | Facility: HOSPITAL | Age: 53
LOS: 1 days | End: 2023-04-27
Payer: MEDICAID

## 2023-04-27 DIAGNOSIS — Z98.890 OTHER SPECIFIED POSTPROCEDURAL STATES: Chronic | ICD-10-CM

## 2023-04-27 DIAGNOSIS — I61.9 NONTRAUMATIC INTRACEREBRAL HEMORRHAGE, UNSPECIFIED: ICD-10-CM

## 2023-04-27 PROCEDURE — 70551 MRI BRAIN STEM W/O DYE: CPT

## 2023-04-27 PROCEDURE — 70544 MR ANGIOGRAPHY HEAD W/O DYE: CPT | Mod: 26,59

## 2023-04-27 PROCEDURE — 70551 MRI BRAIN STEM W/O DYE: CPT | Mod: 26

## 2023-04-27 PROCEDURE — 70544 MR ANGIOGRAPHY HEAD W/O DYE: CPT

## 2023-05-03 ENCOUNTER — APPOINTMENT (OUTPATIENT)
Dept: NEUROSURGERY | Facility: CLINIC | Age: 53
End: 2023-05-03
Payer: MEDICAID

## 2023-05-03 VITALS
HEIGHT: 67 IN | TEMPERATURE: 97.2 F | WEIGHT: 180 LBS | OXYGEN SATURATION: 95 % | HEART RATE: 78 BPM | BODY MASS INDEX: 28.25 KG/M2 | DIASTOLIC BLOOD PRESSURE: 70 MMHG | SYSTOLIC BLOOD PRESSURE: 119 MMHG

## 2023-05-03 PROCEDURE — 99213 OFFICE O/P EST LOW 20 MIN: CPT

## 2023-05-03 RX ORDER — BUDESONIDE AND FORMOTEROL FUMARATE DIHYDRATE 160; 4.5 UG/1; UG/1
AEROSOL RESPIRATORY (INHALATION)
Refills: 0 | Status: ACTIVE | COMMUNITY

## 2023-05-04 ENCOUNTER — TRANSCRIPTION ENCOUNTER (OUTPATIENT)
Age: 53
End: 2023-05-04

## 2023-05-04 NOTE — HISTORY OF PRESENT ILLNESS
[FreeTextEntry1] : MARIELY CHENG is a 52 year old male with PMH of HTN, HLD, DM 2 on metformin, asthma, and anxiety. Positive family history of ruptured cerebral aneurysm, father passed from ruptured aneurysm July 2022, brother is a patient of ours as well with cavernous aneurysm. PCP, Dr. Jarret Fishman ordered brain imaging due to extensive family history. He underwent MRA brain non con at [facility] on 10/6/22 demonstrating 7mm left ICA ophthalmic aneurysm. He is a former smoker of cigarettes, quit more than 5-7 years ago. On 10/27/22, he underwent diagnostic catheter cerebral angiogram demonstrating 5.6mm left ICA ophthalmic artery aneurysm. On 1/26/23, he underwent successful embolization of unruptured L ophthalmic aneurysm with flow diversion using 2 overlapping 4.5 x 14mm and 4.75 x 12mm pipeline stents. Pt did well post-procedure, post-op MR MURPHY with good flow, on ASA/Brilinta 90 BID, discharged home in stable condition on 1/27/23. \par \par On 1/28/23, he returned to [facility] ED with right visual field deficit. CTH showed a small 2.7cm left occipital hemorrhage and was admitted to NSCU for monitoring. CTA with good flow through stent. Brilinta lowered to 60mg BID, continued on aspirin 81mg, Keppra 500mg BID for seizure prophylaxis, multiple scans demonstrated stability.\par \par Today, patient presents for a follow up visit and is here to review the results of 3 month MRI/MRA NOVA. He is doing overall well. States continued improvement in his symptoms since our last office visit. Reports continued improvement in intermittent head pressure and vision where he feels as if he is almost back to normal. Denies new or worsening symptoms of motor, sensory, speech, or visual abnormalities.

## 2023-05-04 NOTE — ASSESSMENT
[FreeTextEntry1] : IMPRESSION:\par 52M with PMH of HTN, HLD, DM 2, asthma, and anxiety. Positive family history of ruptured cerebral aneurysm, father passed July 2022, brother with cavernous aneurysm. Screening MRA 10/6/22 showed L ICA ophthalmic aneurysm. Former smoker. s/p successful embo of unruptured L ophthalmic aneurysm 1/26/23 with 2 overlapping pipeline stents. Pt did well post-procedure, post-op MR MURPHY with good flow, on ASA/Brilinta 90 BID, discharged home. Represented to ED on 1/28/23 with R VF deficit, CTH with small 2.7mm L occipital hemorrhage, CTA with good flow through stent. Brilinta lowered to 60mg BID, continued on ASA 81, and Keppra 500mg BID for seizure ppx.\par \par Patient doing clinically well, reports continued improvement in occasional head pressure and vision. \par Reviewed results of 3 month follow up MRI/MRA NOVA 4/27/23 which shows resolution of left occipital hemorrhage since 1/30/2023 with residual hemosiderin staining. Signal dropout overlying the  left supraclinoid/cavernous internal carotid artery related to the presence of a stent. Good intracranial flow without significant change.\par Discussed 6 month follow up cerebral angiogram procedure which will be done end of July-early August.\par \par \par PLAN:\par Continue ASA 81 and Brilinta 60mg BID\par Taper Keppra to 500mg once a day for 2 weeks then discontinue\par Patient wishes to proceed with 6 month follow up cerebral angiogram on August 3, 2023.

## 2023-05-04 NOTE — REASON FOR VISIT
[Follow-Up: _____] : a [unfilled] follow-up visit [Family Member] : family member [FreeTextEntry1] : Reviewed results of MRI brain non con, MRA brain non con NOVA done on 4/27/23\par \par s/p Cerebral angiogram and embolization of unruptured left ophthalmic aneurysm with pipeline stent 1/26/23\par \par s/p Diagnostic cerebral angiogram 10/27/22

## 2023-06-29 NOTE — OCCUPATIONAL THERAPY INITIAL EVALUATION ADULT - SITTING BALANCE: STATIC
Oculoplastic Surgeon Procedure Text (A): After obtaining clear surgical margins the patient was sent to oculoplastics for surgical repair.  The patient understands they will receive post-surgical care and follow-up from the referring physician's office. good balance

## 2023-07-27 ENCOUNTER — OUTPATIENT (OUTPATIENT)
Dept: OUTPATIENT SERVICES | Facility: HOSPITAL | Age: 53
LOS: 1 days | End: 2023-07-27
Payer: MEDICAID

## 2023-07-27 VITALS
TEMPERATURE: 98 F | WEIGHT: 179.9 LBS | RESPIRATION RATE: 18 BRPM | HEIGHT: 67 IN | HEART RATE: 70 BPM | OXYGEN SATURATION: 100 % | DIASTOLIC BLOOD PRESSURE: 86 MMHG | SYSTOLIC BLOOD PRESSURE: 144 MMHG

## 2023-07-27 DIAGNOSIS — Z98.890 OTHER SPECIFIED POSTPROCEDURAL STATES: Chronic | ICD-10-CM

## 2023-07-27 DIAGNOSIS — Z01.818 ENCOUNTER FOR OTHER PREPROCEDURAL EXAMINATION: ICD-10-CM

## 2023-07-27 DIAGNOSIS — Z79.01 LONG TERM (CURRENT) USE OF ANTICOAGULANTS: ICD-10-CM

## 2023-07-27 DIAGNOSIS — G47.33 OBSTRUCTIVE SLEEP APNEA (ADULT) (PEDIATRIC): ICD-10-CM

## 2023-07-27 DIAGNOSIS — R93.0 ABNORMAL FINDINGS ON DIAGNOSTIC IMAGING OF SKULL AND HEAD, NOT ELSEWHERE CLASSIFIED: Chronic | ICD-10-CM

## 2023-07-27 DIAGNOSIS — I67.1 CEREBRAL ANEURYSM, NONRUPTURED: ICD-10-CM

## 2023-07-27 DIAGNOSIS — E11.9 TYPE 2 DIABETES MELLITUS WITHOUT COMPLICATIONS: ICD-10-CM

## 2023-07-27 LAB
A1C WITH ESTIMATED AVERAGE GLUCOSE RESULT: 5.7 % — HIGH (ref 4–5.6)
ALBUMIN SERPL ELPH-MCNC: 4.9 G/DL — SIGNIFICANT CHANGE UP (ref 3.3–5)
ALP SERPL-CCNC: 70 U/L — SIGNIFICANT CHANGE UP (ref 40–120)
ALT FLD-CCNC: 19 U/L — SIGNIFICANT CHANGE UP (ref 10–45)
ANION GAP SERPL CALC-SCNC: 12 MMOL/L — SIGNIFICANT CHANGE UP (ref 5–17)
AST SERPL-CCNC: 15 U/L — SIGNIFICANT CHANGE UP (ref 10–40)
BILIRUB SERPL-MCNC: 1.2 MG/DL — SIGNIFICANT CHANGE UP (ref 0.2–1.2)
BLD GP AB SCN SERPL QL: NEGATIVE — SIGNIFICANT CHANGE UP
BUN SERPL-MCNC: 12 MG/DL — SIGNIFICANT CHANGE UP (ref 7–23)
CALCIUM SERPL-MCNC: 9.9 MG/DL — SIGNIFICANT CHANGE UP (ref 8.4–10.5)
CHLORIDE SERPL-SCNC: 103 MMOL/L — SIGNIFICANT CHANGE UP (ref 96–108)
CO2 SERPL-SCNC: 26 MMOL/L — SIGNIFICANT CHANGE UP (ref 22–31)
CREAT SERPL-MCNC: 0.92 MG/DL — SIGNIFICANT CHANGE UP (ref 0.5–1.3)
EGFR: 101 ML/MIN/1.73M2 — SIGNIFICANT CHANGE UP
ESTIMATED AVERAGE GLUCOSE: 117 MG/DL — HIGH (ref 68–114)
GLUCOSE SERPL-MCNC: 105 MG/DL — HIGH (ref 70–99)
HCT VFR BLD CALC: 45.5 % — SIGNIFICANT CHANGE UP (ref 39–50)
HGB BLD-MCNC: 15.4 G/DL — SIGNIFICANT CHANGE UP (ref 13–17)
MCHC RBC-ENTMCNC: 30.9 PG — SIGNIFICANT CHANGE UP (ref 27–34)
MCHC RBC-ENTMCNC: 33.8 GM/DL — SIGNIFICANT CHANGE UP (ref 32–36)
MCV RBC AUTO: 91.4 FL — SIGNIFICANT CHANGE UP (ref 80–100)
NRBC # BLD: 0 /100 WBCS — SIGNIFICANT CHANGE UP (ref 0–0)
PA ADP PRP-ACNC: 38 PRU — LOW (ref 194–417)
PLATELET # BLD AUTO: 183 K/UL — SIGNIFICANT CHANGE UP (ref 150–400)
PLATELET RESPONSE ASPIRIN RESULT: 350 ARU — SIGNIFICANT CHANGE UP (ref 350–700)
POTASSIUM SERPL-MCNC: 4.2 MMOL/L — SIGNIFICANT CHANGE UP (ref 3.5–5.3)
POTASSIUM SERPL-SCNC: 4.2 MMOL/L — SIGNIFICANT CHANGE UP (ref 3.5–5.3)
PROT SERPL-MCNC: 7.8 G/DL — SIGNIFICANT CHANGE UP (ref 6–8.3)
RBC # BLD: 4.98 M/UL — SIGNIFICANT CHANGE UP (ref 4.2–5.8)
RBC # FLD: 13 % — SIGNIFICANT CHANGE UP (ref 10.3–14.5)
RH IG SCN BLD-IMP: POSITIVE — SIGNIFICANT CHANGE UP
SODIUM SERPL-SCNC: 141 MMOL/L — SIGNIFICANT CHANGE UP (ref 135–145)
WBC # BLD: 5.81 K/UL — SIGNIFICANT CHANGE UP (ref 3.8–10.5)
WBC # FLD AUTO: 5.81 K/UL — SIGNIFICANT CHANGE UP (ref 3.8–10.5)

## 2023-07-27 PROCEDURE — 85576 BLOOD PLATELET AGGREGATION: CPT

## 2023-07-27 PROCEDURE — 80053 COMPREHEN METABOLIC PANEL: CPT

## 2023-07-27 PROCEDURE — 86901 BLOOD TYPING SEROLOGIC RH(D): CPT

## 2023-07-27 PROCEDURE — 83036 HEMOGLOBIN GLYCOSYLATED A1C: CPT

## 2023-07-27 PROCEDURE — G0463: CPT

## 2023-07-27 PROCEDURE — 36415 COLL VENOUS BLD VENIPUNCTURE: CPT

## 2023-07-27 PROCEDURE — 85027 COMPLETE CBC AUTOMATED: CPT

## 2023-07-27 PROCEDURE — 86900 BLOOD TYPING SEROLOGIC ABO: CPT

## 2023-07-27 PROCEDURE — 86850 RBC ANTIBODY SCREEN: CPT

## 2023-07-27 NOTE — H&P PST ADULT - NS PRO AD PATIENT TYPE
InitialPsychiatric Consult: Time Spent 55 Minutes  Jose Jasmine MD.     Plan:  1. Remeron 7.5mg hs   2. Seroquel prn 25mg q 2 hours as replacement for Clonazepam  3. Consider stopping Wellbutrin and lowering If Pt tolerates Remeron and Seroquel.    Health Care Proxy (HCP)

## 2023-07-27 NOTE — H&P PST ADULT - NSICDXFAMILYHX_GEN_ALL_CORE_FT
FAMILY HISTORY:  Father  Still living? Unknown  FH: cerebral aneurysm, Age at diagnosis: Age Unknown    Mother  Still living? Unknown  FH: lung cancer, Age at diagnosis: Age Unknown    Sibling  Still living? Unknown  FH: cerebral aneurysm, Age at diagnosis: Age Unknown

## 2023-07-27 NOTE — H&P PST ADULT - NSICDXPASTMEDICALHX_GEN_ALL_CORE_FT
PAST MEDICAL HISTORY:  2019 novel coronavirus disease (COVID-19)     Ankle injury     Anxiety and depression     Asthma     DM2 (diabetes mellitus, type 2)     Fall     Hemorrhage in the brain     History of cerebral aneurysm     HLD (hyperlipidemia)     HTN (hypertension)

## 2023-07-27 NOTE — H&P PST ADULT - HISTORY OF PRESENT ILLNESS
51 yo male presents to [facility] prior to scheduled cerebral angiogram on 8/3/23 with Dr. Anderson Stewart. Pmhx includes Asthma (infrequent inhaler use; stable on symbicort BID; hospitalized and intubated in his early 20s for asthma exacerbation), HTN, HLD, ETOH abuse (last drink 11/2023), anxiety/depression (not on meds currently), left opthalmic artery aneurysm s/p stent placement in 1/26/2023; post op left occipital small hemorrhage (admitted 1/28/23-1/30/23). Currently on aspirin 81mg and Brilinta 60mg BID; endorses mild intermittent headaches (improving since January 2023). Denies fever, chills, chest pain, palpitations, sob/vides, dizziness, changes in vision, imbalance.

## 2023-07-27 NOTE — H&P PST ADULT - GENERAL
Nick Randhawa   MRN: [id number]    Department:  [facility] Emergency Department   Date of Visit:  2/13/2019           Disclosure     Insurance plans vary and the physician(s) referred by the ER may not be covered by your plan.  Please contact tell this physician (or your personal doctor if your instructions are to return to your personal doctor) about any new or lasting problems. The primary care or specialist physician will see patients referred from the [facility] Emergency Department.  Lucia Ashley details…

## 2023-07-27 NOTE — H&P PST ADULT - ASSESSMENT
DASI score: 6.5  DASI activity: goes for walks, sometimes 20k steps a day, 2 flights of stairs, all house chores  Loose teeth or denture: denies

## 2023-07-27 NOTE — H&P PST ADULT - NEGATIVE RESPIRATORY AND THORAX SYMPTOMS
8/3/2021    Kentrell Harris (:  1978) is a 44 y.o. female, here for a preventive medicine evaluation. There is no problem list on file for this patient. Review of Systems   Constitutional: Negative for chills, fatigue, fever and unexpected weight change. HENT: Negative for congestion, ear pain, rhinorrhea and sore throat. Eyes: Negative for pain and visual disturbance. Respiratory: Negative for cough, chest tightness, shortness of breath and wheezing. Cardiovascular: Negative for chest pain and palpitations. Gastrointestinal: Negative for abdominal pain, blood in stool, constipation, diarrhea, nausea and vomiting. Genitourinary: Negative for difficulty urinating, frequency, hematuria and urgency. Musculoskeletal: Negative for back pain, joint swelling, myalgias and neck pain. Skin: Negative for rash. Neurological: Negative for dizziness and headaches. Hematological: Negative for adenopathy. Does not bruise/bleed easily. Psychiatric/Behavioral: Negative for behavioral problems and sleep disturbance. The patient is not nervous/anxious.         Prior to Visit Medications    Not on File        No Known Allergies    Past Medical History:   Diagnosis Date    Vitamin D deficiency        Past Surgical History:   Procedure Laterality Date    TONSILLECTOMY AND ADENOIDECTOMY         Social History     Socioeconomic History    Marital status: Single     Spouse name: Not on file    Number of children: Not on file    Years of education: Not on file    Highest education level: Not on file   Occupational History    Not on file   Tobacco Use    Smoking status: Never Smoker    Smokeless tobacco: Never Used   Substance and Sexual Activity    Alcohol use: No    Drug use: Not on file    Sexual activity: Not on file   Other Topics Concern    Not on file   Social History Narrative    Not on file     Social Determinants of Health     Financial Resource Strain: Low Risk     Difficulty of Paying Living Expenses: Not very hard   Food Insecurity: No Food Insecurity    Worried About Running Out of Food in the Last Year: Never true    Ran Out of Food in the Last Year: Never true   Transportation Needs:     Lack of Transportation (Medical):      Lack of Transportation (Non-Medical):    Physical Activity:     Days of Exercise per Week:     Minutes of Exercise per Session:    Stress:     Feeling of Stress :    Social Connections:     Frequency of Communication with Friends and Family:     Frequency of Social Gatherings with Friends and Family:     Attends Hindu Services:     Active Member of Clubs or Organizations:     Attends Club or Organization Meetings:     Marital Status:    Intimate Partner Violence:     Fear of Current or Ex-Partner:     Emotionally Abused:     Physically Abused:     Sexually Abused:         Family History   Problem Relation Age of Onset    Cancer Maternal Uncle     Early Death Maternal Uncle     Heart Disease Mother     High Blood Pressure Mother     High Cholesterol Mother     Kidney Disease Mother     Miscarriages / [place] Mother     Arthritis Maternal Grandmother     Diabetes Maternal Grandmother     Stroke Maternal Grandmother     Arthritis Maternal Grandfather     Asthma Maternal Grandfather     Diabetes Paternal Grandmother     Arthritis Paternal Grandfather     Cancer Paternal Grandfather     Mental Retardation Maternal Cousin     No Known Problems Father     Birth Defects Neg Hx     Depression Neg Hx     Hearing Loss Neg Hx     Learning Disabilities Neg Hx     Mental Illness Neg Hx     Substance Abuse Neg Hx     Vision Loss Neg Hx     Other Neg Hx        ADVANCE DIRECTIVE: N, <no information>    Vitals:    08/03/21 1103   BP: 126/84   Site: Left Upper Arm   Position: Sitting   Cuff Size: Medium Adult   Pulse: 78   Resp: 16   SpO2: 98%   Weight: 211 lb 9.6 oz (96 kg)   Height: 5' 4.6\" (1.641 m)     Estimated body mass index is 35.65 kg/m² as calculated from the following:    Height as of this encounter: 5' 4.6\" (1.641 m). Weight as of this encounter: 211 lb 9.6 oz (96 kg). Physical Exam  Vitals and nursing note reviewed. Constitutional:       Appearance: She is well-developed. HENT:      Head: Normocephalic and atraumatic. Right Ear: External ear normal.      Left Ear: External ear normal.      Nose: Nose normal.      Mouth/Throat:      Mouth: Mucous membranes are moist.   Eyes:      Pupils: Pupils are equal, round, and reactive to light. Neck:      Thyroid: No thyromegaly. Cardiovascular:      Rate and Rhythm: Normal rate and regular rhythm. Heart sounds: Normal heart sounds. Pulmonary:      Breath sounds: Normal breath sounds. No wheezing or rales. Abdominal:      General: Bowel sounds are normal.      Palpations: Abdomen is soft. Tenderness: There is no abdominal tenderness. There is no guarding or rebound. Musculoskeletal:         General: Normal range of motion. Cervical back: Neck supple. Lymphadenopathy:      Cervical: No cervical adenopathy. Skin:     General: Skin is warm and dry. Findings: No rash. Neurological:      Mental Status: She is alert and oriented to person, place, and time. Cranial Nerves: No cranial nerve deficit. Deep Tendon Reflexes: Reflexes are normal and symmetric. No flowsheet data found.     Lab Results   Component Value Date    CHOL 211 09/28/2019    CHOL 231 09/29/2018    CHOL 188 09/30/2017    TRIG 156 09/28/2019    TRIG 244 09/29/2018    TRIG 137 09/30/2017    HDL 43 09/28/2019    HDL 41 09/29/2018    HDL 39 09/30/2017    LDLCALC 137 09/28/2019    LDLCALC 141 09/29/2018    LDLCALC 122 09/30/2017    GLUCOSE 94 09/28/2019    LABA1C 5.6 09/28/2019    LABA1C 5.6 09/29/2018    LABA1C 5.3 09/30/2017       The 10-year ASCVD risk score (Hood Reynolds et al., 2013) is: 1.1%    Values used to calculate the score:      Age: 44 years Sex: Female      Is Non- : No      Diabetic: No      Tobacco smoker: No      Systolic Blood Pressure: 379 mmHg      Is BP treated: No      HDL Cholesterol: 43 mg/dL      Total Cholesterol: 211 mg/dL    Immunization History   Administered Date(s) Administered    COVID-19, Pfizer, PF, 30mcg/0.3mL 03/23/2021, 04/14/2021       Health Maintenance   Topic Date Due    HIV screen  Never done    DTaP/Tdap/Td vaccine (1 - Tdap) Never done    Cervical cancer screen  10/07/2015    Flu vaccine (1) 09/01/2021    Lipid screen  09/28/2024    COVID-19 Vaccine  Completed    Hepatitis C screen  Completed    Hepatitis A vaccine  Aged Out    Hepatitis B vaccine  Aged Out    Hib vaccine  Aged Out    Meningococcal (ACWY) vaccine  Aged Out    Pneumococcal 0-64 years Vaccine  Aged Out          ASSESSMENT/PLAN:  1. Well adult exam  Encouraged diet, exercise and weight loss. Reviewed health maintenance      No follow-ups on file. An electronic signature was used to authenticate this note.     --Alphonse Valdez MD on 8/3/2021 at 11:38 AM no wheezing/no dyspnea/no cough

## 2023-08-03 ENCOUNTER — TRANSCRIPTION ENCOUNTER (OUTPATIENT)
Age: 53
End: 2023-08-03

## 2023-08-03 ENCOUNTER — APPOINTMENT (OUTPATIENT)
Dept: NEUROSURGERY | Facility: HOSPITAL | Age: 53
End: 2023-08-03

## 2023-08-03 ENCOUNTER — OUTPATIENT (OUTPATIENT)
Dept: OUTPATIENT SERVICES | Facility: HOSPITAL | Age: 53
LOS: 1 days | End: 2023-08-03
Payer: MEDICAID

## 2023-08-03 VITALS
HEART RATE: 71 BPM | RESPIRATION RATE: 16 BRPM | TEMPERATURE: 98 F | OXYGEN SATURATION: 97 % | DIASTOLIC BLOOD PRESSURE: 94 MMHG | WEIGHT: 179.9 LBS | HEIGHT: 67 IN | SYSTOLIC BLOOD PRESSURE: 191 MMHG

## 2023-08-03 VITALS
OXYGEN SATURATION: 97 % | RESPIRATION RATE: 18 BRPM | DIASTOLIC BLOOD PRESSURE: 84 MMHG | HEART RATE: 73 BPM | SYSTOLIC BLOOD PRESSURE: 127 MMHG

## 2023-08-03 DIAGNOSIS — I67.1 CEREBRAL ANEURYSM, NONRUPTURED: ICD-10-CM

## 2023-08-03 DIAGNOSIS — R93.0 ABNORMAL FINDINGS ON DIAGNOSTIC IMAGING OF SKULL AND HEAD, NOT ELSEWHERE CLASSIFIED: Chronic | ICD-10-CM

## 2023-08-03 DIAGNOSIS — Z98.890 OTHER SPECIFIED POSTPROCEDURAL STATES: Chronic | ICD-10-CM

## 2023-08-03 PROCEDURE — C1887: CPT

## 2023-08-03 PROCEDURE — 36226 PLACE CATH VERTEBRAL ART: CPT

## 2023-08-03 PROCEDURE — 36224 PLACE CATH CAROTD ART: CPT

## 2023-08-03 PROCEDURE — C1894: CPT

## 2023-08-03 PROCEDURE — 36224 PLACE CATH CAROTD ART: CPT | Mod: LT

## 2023-08-03 PROCEDURE — 36226 PLACE CATH VERTEBRAL ART: CPT | Mod: RT

## 2023-08-03 PROCEDURE — C1769: CPT

## 2023-08-03 RX ORDER — VITAMIN E 100 UNIT
1 CAPSULE ORAL
Refills: 0 | DISCHARGE

## 2023-08-03 RX ORDER — CHOLECALCIFEROL (VITAMIN D3) 125 MCG
0 CAPSULE ORAL
Refills: 0 | DISCHARGE

## 2023-08-03 RX ORDER — BUDESONIDE AND FORMOTEROL FUMARATE DIHYDRATE 160; 4.5 UG/1; UG/1
2 AEROSOL RESPIRATORY (INHALATION)
Refills: 0 | DISCHARGE

## 2023-08-03 RX ORDER — TICAGRELOR 60 MG/1
60 TABLET ORAL TWICE DAILY
Qty: 180 | Refills: 0 | Status: DISCONTINUED | COMMUNITY
Start: 2023-05-23 | End: 2023-08-03

## 2023-08-03 RX ORDER — OMEGA-3 ACID ETHYL ESTERS 1 G
2 CAPSULE ORAL
Qty: 0 | Refills: 0 | DISCHARGE

## 2023-08-03 RX ORDER — METFORMIN HYDROCHLORIDE 850 MG/1
1 TABLET ORAL
Qty: 0 | Refills: 0 | DISCHARGE

## 2023-08-03 RX ORDER — SODIUM CHLORIDE 9 MG/ML
1000 INJECTION, SOLUTION INTRAVENOUS
Refills: 0 | Status: DISCONTINUED | OUTPATIENT
Start: 2023-08-03 | End: 2023-08-17

## 2023-08-03 RX ORDER — ASPIRIN 81 MG/1
81 TABLET, CHEWABLE ORAL DAILY
Qty: 90 | Refills: 3 | Status: DISCONTINUED | COMMUNITY
Start: 2022-12-27 | End: 2023-08-03

## 2023-08-03 RX ORDER — ATORVASTATIN CALCIUM 80 MG/1
1 TABLET, FILM COATED ORAL
Qty: 0 | Refills: 0 | DISCHARGE

## 2023-08-03 RX ORDER — HYDROMORPHONE HYDROCHLORIDE 2 MG/ML
0.5 INJECTION INTRAMUSCULAR; INTRAVENOUS; SUBCUTANEOUS
Refills: 0 | Status: DISCONTINUED | OUTPATIENT
Start: 2023-08-03 | End: 2023-08-03

## 2023-08-03 RX ORDER — LOSARTAN POTASSIUM 100 MG/1
0.5 TABLET, FILM COATED ORAL
Refills: 0 | DISCHARGE

## 2023-08-03 NOTE — ASU DISCHARGE PLAN (ADULT/PEDIATRIC) - NURSING INSTRUCTIONS
Please feel free to contact us at [phone] if any problems arise. After 6PM, Monday through Friday, on weekends and on holidays, please call [phone] and ask for the radiology resident on call to be paged.

## 2023-08-03 NOTE — CHART NOTE - NSCHARTNOTEFT_GEN_A_CORE
Interventional Neuro Radiology  Pre-Procedure Note    This is a 53 yo male presents with pmhx includes Asthma (infrequent inhaler use; stable on symbicort BID; hospitalized and intubated in his early 20s for asthma exacerbation), HTN, HLD, ETOH abuse (last drink 11/2023), anxiety/depression (not on meds currently), left opthalmic artery aneurysm s/p stent placement in 1/26/2023; post op left occipital small hemorrhage (admitted 1/28/23-1/30/23). Currently on aspirin 81mg and Brilinta 60mg BID; endorses mild intermittent headaches (improving since January 2023). Denies fever, chills, chest pain, palpitations, sob/vides, dizziness, changes in vision, imbalance.    Neuro Exam: Awake and alert, oriented x3, fluent, normal naming and repetition, follows 3 step commands. Extraocular movements intact, no nystagmus, visual fields full, face symmetric, tongue midline. No drift, 5/5 power x 4 extremities. Normal sensation to LT. Normal finger-to-nose and rapid alternating movements.    PAST MEDICAL & SURGICAL HISTORY:  History of cerebral aneurysm  HTN (hypertension)  HLD (hyperlipidemia)  Asthma  2019 novel coronavirus disease (COVID-19)  Anxiety and depression  Ankle injury  DM2 (diabetes mellitus, type 2)  Fall  Hemorrhage in the brain  H/O colonoscopy 2 years ago    Social History:   Denies tobacco use    FAMILY HISTORY:  FH: lung cancer (Mother)  FH: cerebral aneurysm (Father, Sibling)    Allergies:   No Known Allergies    Current Medications:   · 	Brilinta (ticagrelor) 60 mg oral tablet: Last Dose Taken:  , 1 tab(s) orally 2 times a day   · 	albuterol 90 mcg/inh inhalation aerosol: Last Dose Taken:  , 2 puff(s) inhaled every 6 hours, As needed, Shortness of Breath and/or Wheezing  · 	aspirin 81 mg oral delayed release tablet: Last Dose Taken:  , 1 tab(s) orally once a day  · 	acetaminophen 325 mg oral tablet: Last Dose Taken:  , 2 tab(s) orally every 6 hours, As needed, Mild Pain (1 - 3)  · 	metFORMIN 500 mg oral tablet: Last Dose Taken:  , 1 tab(s) orally once a day--evening  · 	atorvastatin 40 mg oral tablet: Last Dose Taken:  , 1 tab(s) orally once a day  · 	Omega-3 1000 mg oral capsule: Last Dose Taken:  , 2 cap(s) orally once a day  · 	losartan 25 mg oral tablet: Last Dose Taken:  , orally once a day  · 	Symbicort 80 mcg-4.5 mcg/inh inhalation aerosol: Last Dose Taken:  , 2 inhaled 2 times a day  · 	vitamin E 400 intl units oral capsule: Last Dose Taken:  , 1 cap(s) orally 2 times a day  · 	Vitamin D3: Last Dose Taken:  , 1000 INTERNATIONAL UNITS 1 TAB DAILY      Labs:                         15.4   5.81  )-----------( 183      ( 27 Jul 2023 14:07 )             45.5       07-27    141  |  103  |  12  ----------------------------<  105<H>  4.2   |  26  |  0.92      TPro  7.8  /  Alb  4.9  /  TBili  1.2  /  DBili  x   /  AST  15  /  ALT  19  /  AlkPhos  70  07-27        Assessment/Plan:   This is a 51yo male  presents with left opthalmic artery aneurysm s/p stent. Patient presents to neuro-IR for selective cerebral angiography. Procedure/ risks/ benefits/ goals/ alternatives were explained. Risks include but are not limited to stroke/ vessel injury/ hemorrhage/ groin hematoma. All questions answered. Informed content obtained from patient. Consent placed in chart.     Emely Potter PA-C  [phone]

## 2023-08-03 NOTE — ASU PATIENT PROFILE, ADULT - FALL HARM RISK - RISK INTERVENTIONS

## 2023-08-03 NOTE — CHART NOTE - NSCHARTNOTEFT_GEN_A_CORE
Interventional Neuro- Radiology   Procedure Note    Procedure: Selective Cerebral Angiography   Pre- Procedure Diagnosis: left opthalmic artery aneurysm s/p stent   Post- Procedure Diagnosis: no residual aneurysm, no in stent stenosis     : Dr. Terry MD  Fellow: Dr. Shea   Physician Assistant: Emely Potter PA-C    RN: Annelise/ Jyothi   Tech: Pedro     Anesthesia: Dr. Her  (MAC)      I/Os:  Fluids: 250cc   Mcdonnell: DTV   Contrast: 33cc   Estimated Blood Loss: <10cc      Preliminary Report:  Under MAC, using a 5/4Fr short sheath to the right radial artery examination of left internal carotid artery/ right vertebral artery via selective cerebral angiography demonstrates no residual aneurysm, no in stent stenosis _. ( Official note to follow).    Patient tolerated procedure well, vital signs stable, hemodynamically stable, no change in neurological status compared to baseline. Results discussed with patient and their family. Radial sheath d/c'ed, manual compression held to hemostasis, no active bleeding, no hematoma, quick clot dressing applied at 850h.  Patient transferred to IR recovery for further care/ monitoring.     Emely Potter PA-C  [phone] no abdominal pain, no bloating, no constipation, no diarrhea, no nausea and no vomiting.

## 2023-08-03 NOTE — ASU DISCHARGE PLAN (ADULT/PEDIATRIC) - CARE PROVIDER_API CALL
Anderson Stewart  Neurosurgery  [address]  [place] [address]  Phone: [phone]  Fax: [phone]  Follow Up Time:

## 2023-08-16 NOTE — PACU DISCHARGE NOTE - THE ANESTHESIA ORDERS USED IN THE PACU ORDER SET WILL BE DISCONTINUED UPON TRANSFER OF THIS PATIENT
Statement Selected PAST SURGICAL HISTORY:  S/P cataract surgery, right     Torn meniscus bilateral knee 2011

## 2023-08-17 DIAGNOSIS — Z09 ENCOUNTER FOR FOLLOW-UP EXAMINATION AFTER COMPLETED TREATMENT FOR CONDITIONS OTHER THAN MALIGNANT NEOPLASM: ICD-10-CM

## 2023-09-13 ENCOUNTER — NON-APPOINTMENT (OUTPATIENT)
Age: 53
End: 2023-09-13

## 2023-11-22 ENCOUNTER — RX RENEWAL (OUTPATIENT)
Age: 53
End: 2023-11-22

## 2023-12-20 PROBLEM — I61.9 NONTRAUMATIC INTRACEREBRAL HEMORRHAGE, UNSPECIFIED: Chronic | Status: ACTIVE | Noted: 2023-07-27

## 2024-01-01 NOTE — DISCHARGE NOTE PROVIDER - CARE PROVIDERS DIRECT ADDRESSES
,DirectAddress_Unknown
Screen#: [id number]  Screen Date: N/A  Screen Comment: N/A    Screen#: [id number]  Screen Date: 2024  Screen Comment: N/A

## 2024-01-29 ENCOUNTER — RESULT REVIEW (OUTPATIENT)
Age: 54
End: 2024-01-29

## 2024-02-26 ENCOUNTER — APPOINTMENT (OUTPATIENT)
Dept: MRI IMAGING | Facility: HOSPITAL | Age: 54
End: 2024-02-26

## 2024-02-26 ENCOUNTER — OUTPATIENT (OUTPATIENT)
Dept: OUTPATIENT SERVICES | Facility: HOSPITAL | Age: 54
LOS: 1 days | End: 2024-02-26
Payer: MEDICAID

## 2024-02-26 DIAGNOSIS — Z98.890 OTHER SPECIFIED POSTPROCEDURAL STATES: Chronic | ICD-10-CM

## 2024-02-26 DIAGNOSIS — R93.0 ABNORMAL FINDINGS ON DIAGNOSTIC IMAGING OF SKULL AND HEAD, NOT ELSEWHERE CLASSIFIED: Chronic | ICD-10-CM

## 2024-02-26 DIAGNOSIS — I67.1 CEREBRAL ANEURYSM, NONRUPTURED: ICD-10-CM

## 2024-02-26 PROCEDURE — 70544 MR ANGIOGRAPHY HEAD W/O DYE: CPT | Mod: 26

## 2024-02-26 PROCEDURE — 70544 MR ANGIOGRAPHY HEAD W/O DYE: CPT

## 2024-02-28 ENCOUNTER — APPOINTMENT (OUTPATIENT)
Dept: NEUROSURGERY | Facility: CLINIC | Age: 54
End: 2024-02-28
Payer: MEDICAID

## 2024-02-28 VITALS
WEIGHT: 185 LBS | BODY MASS INDEX: 29.03 KG/M2 | HEART RATE: 84 BPM | HEIGHT: 67 IN | OXYGEN SATURATION: 92 % | TEMPERATURE: 97.2 F

## 2024-02-28 DIAGNOSIS — I67.1 CEREBRAL ANEURYSM, NONRUPTURED: ICD-10-CM

## 2024-02-28 DIAGNOSIS — I61.9 NONTRAUMATIC INTRACEREBRAL HEMORRHAGE, UNSPECIFIED: ICD-10-CM

## 2024-02-28 PROCEDURE — 99214 OFFICE O/P EST MOD 30 MIN: CPT

## 2024-02-28 RX ORDER — ASPIRIN 325 MG/1
325 TABLET, COATED ORAL
Qty: 90 | Refills: 0 | Status: DISCONTINUED | COMMUNITY
Start: 2023-11-22 | End: 2024-02-28

## 2024-02-28 RX ORDER — ASPIRIN 325 MG/1
325 TABLET, FILM COATED ORAL
Qty: 90 | Refills: 1 | Status: DISCONTINUED | COMMUNITY
Start: 2023-08-03 | End: 2024-02-28

## 2024-02-28 RX ORDER — ASPIRIN 81 MG/1
81 TABLET, CHEWABLE ORAL DAILY
Qty: 90 | Refills: 3 | Status: ACTIVE | COMMUNITY
Start: 2024-02-28 | End: 1900-01-01

## 2024-02-28 RX ORDER — LEVETIRACETAM 500 MG/1
500 TABLET, FILM COATED ORAL TWICE DAILY
Qty: 60 | Refills: 2 | Status: DISCONTINUED | COMMUNITY
Start: 2023-02-09 | End: 2024-02-28

## 2024-02-28 RX ORDER — TICAGRELOR 60 MG/1
60 TABLET ORAL
Refills: 0 | Status: DISCONTINUED | COMMUNITY
End: 2024-02-28

## 2024-02-28 NOTE — ASSESSMENT
[FreeTextEntry1] : IMPRESSION: 53M with PMH of HTN, HLD, DM 2, asthma, and anxiety. Positive family history of ruptured cerebral aneurysm, father passed July 2022, brother with cavernous aneurysm. Screening MRA 10/6/22 showed L ICA ophthalmic aneurysm. Former smoker. s/p successful embo of unruptured L ophthalmic aneurysm 1/26/23 with 2 overlapping pipeline stents. Pt did well post-procedure, post-op MR JEFFREY with good flow, on ASA/Brilinta 90 BID, discharged home. Represented to ED on 1/28/23 with R VF deficit, CTH with small 2.7mm L occipital hemorrhage, CTA with good flow through stent. Brilinta lowered to 60mg BID, continued on ASA 81, and Keppra 500mg BID for seizure ppx. s/p f/u angio 8/3/23 demonstrating complete occlusion of the previously treated left ophthalmic aneurysm, patency of the left ophthalmic artery and no evidence of in stent stenosis. Brilinta discontinued, started ASA 325mg for 6 months.  Repeat MRA head NOVA w/wo IV contrast 2/26/24 shows no residual or new aneurysms, good intracranial flow similar to 4/27/23. On his annual follow up, he is doing overall well. Reports residual posterior head pressure, and one transient episode of visual floaters which resolved. Compliant with .   PLAN: Decrease aspirin to 81mg daily from 325mg daily Repeat MRA head NOVA non con and MRI head non con in 6 months (18 month post-embo follow up) - August 2024 Follow up after to review results Will obtain yearly imaging after that Should his eye symptoms persist or worsen, will consider referring to ophthalmology

## 2024-02-28 NOTE — HISTORY OF PRESENT ILLNESS
[FreeTextEntry1] : MARIELY CHENG is a 53 year old male with PMH of HTN, HLD, DM 2 on metformin, asthma, and anxiety. Positive family history of ruptured aneurysm, father passed from ruptured aneurysm July 2022, brother is a patient of ours as well with cavernous aneurysm. PCP Dr. Jarret Fishman ordered screening brain imaging due to extensive family history. He underwent MRA brain non con at [facility] on 10/6/22 demonstrating 7mm left ICA ophthalmic aneurysm. He is a former smoker of cigarettes, quit more than 5-7 years ago. On 10/27/22, he underwent diagnostic catheter cerebral angiogram demonstrating 5.6mm left ICA ophthalmic artery aneurysm. On 1/26/23, he underwent successful embolization of unruptured L ophthalmic aneurysm with flow diversion using 2 overlapping 4.5 x 14mm and 4.75 x 12mm pipeline stents. Pt did well post-procedure, post-op MR MURPHY with good flow, on ASA/Brilinta 90 BID, discharged home in stable condition on 1/27/23. On 1/28/23, he returned to [facility] ED with right visual field deficit. CTH showed a small 2.7cm left occipital hemorrhage and was admitted to NSCU for monitoring. CTA with good flow through stent. Brilinta lowered to 60mg BID, continued on aspirin 81mg, Keppra 500mg BID for seizure prophylaxis, multiple scans demonstrated stability. 3 month follow up MRI/MRA NOVA 4/27/23 which shows resolution of left occipital hemorrhage since 1/30/2023 with residual hemosiderin staining. Signal dropout overlying the left supraclinoid/cavernous internal carotid artery related to the presence of a stent. Good intracranial flow without significant change. On 8/3/23, he underwent follow up cerebral angiogram demonstrating complete occlusion of the previously treated left ophthalmic aneurysm, patency of the left ophthalmic artery and no evidence of in stent stenosis. Plan to discontinue Brilinta, start ASA 325mg daily for 6 months, repeat MRA head NOVA w/wo IV contrast in 6 months and follow up after imaging.  Today, patient presents for a follow up to review results of repeat MRA head NOVA w/wo IV contrast done 2/26/24. He states he has residual mild occasional head pressure in the back of his head. Also reports one episode of seeing floaters which resolved on its own. Compliant on aspirin 325mg daily.

## 2024-02-28 NOTE — REASON FOR VISIT
[Follow-Up: _____] : a [unfilled] follow-up visit [Family Member] : family member [FreeTextEntry1] : Reviewed results of MRA head NOVA w/wo IV contrast done 2/26/24 s/p Follow up cerebral angiogram 8/3/23 s/p Cerebral angiogram and embolization of unruptured left ophthalmic aneurysm with pipeline stent 1/26/23 s/p Diagnostic cerebral angiogram 10/27/22

## 2024-08-09 ENCOUNTER — APPOINTMENT (OUTPATIENT)
Dept: MRI IMAGING | Facility: HOSPITAL | Age: 54
End: 2024-08-09

## 2024-10-24 ENCOUNTER — RX RENEWAL (OUTPATIENT)
Age: 54
End: 2024-10-24

## 2024-10-24 RX ORDER — ASPIRIN 81 MG/1
81 TABLET, CHEWABLE ORAL
Qty: 90 | Refills: 0 | Status: ACTIVE | COMMUNITY
Start: 2024-10-24 | End: 1900-01-01

## 2024-11-06 ENCOUNTER — RESULT REVIEW (OUTPATIENT)
Age: 54
End: 2024-11-06

## 2024-11-06 ENCOUNTER — OUTPATIENT (OUTPATIENT)
Dept: OUTPATIENT SERVICES | Facility: HOSPITAL | Age: 54
LOS: 1 days | End: 2024-11-06
Payer: MEDICAID

## 2024-11-06 ENCOUNTER — APPOINTMENT (OUTPATIENT)
Dept: MRI IMAGING | Facility: HOSPITAL | Age: 54
End: 2024-11-06

## 2024-11-06 DIAGNOSIS — R93.0 ABNORMAL FINDINGS ON DIAGNOSTIC IMAGING OF SKULL AND HEAD, NOT ELSEWHERE CLASSIFIED: Chronic | ICD-10-CM

## 2024-11-06 DIAGNOSIS — I67.1 CEREBRAL ANEURYSM, NONRUPTURED: ICD-10-CM

## 2024-11-06 DIAGNOSIS — Z98.890 OTHER SPECIFIED POSTPROCEDURAL STATES: Chronic | ICD-10-CM

## 2024-11-06 PROCEDURE — 70544 MR ANGIOGRAPHY HEAD W/O DYE: CPT

## 2024-11-06 PROCEDURE — 70544 MR ANGIOGRAPHY HEAD W/O DYE: CPT | Mod: 26

## 2024-11-13 ENCOUNTER — APPOINTMENT (OUTPATIENT)
Dept: NEUROSURGERY | Facility: CLINIC | Age: 54
End: 2024-11-13
Payer: MEDICAID

## 2024-11-13 ENCOUNTER — NON-APPOINTMENT (OUTPATIENT)
Age: 54
End: 2024-11-13

## 2024-11-13 VITALS
HEIGHT: 67 IN | BODY MASS INDEX: 28.72 KG/M2 | SYSTOLIC BLOOD PRESSURE: 145 MMHG | HEART RATE: 78 BPM | OXYGEN SATURATION: 96 % | WEIGHT: 183 LBS | DIASTOLIC BLOOD PRESSURE: 81 MMHG

## 2024-11-13 DIAGNOSIS — I67.1 CEREBRAL ANEURYSM, NONRUPTURED: ICD-10-CM

## 2024-11-13 DIAGNOSIS — I61.9 NONTRAUMATIC INTRACEREBRAL HEMORRHAGE, UNSPECIFIED: ICD-10-CM

## 2024-11-13 PROCEDURE — 99215 OFFICE O/P EST HI 40 MIN: CPT

## 2024-12-02 DIAGNOSIS — Z00.00 ENCOUNTER FOR GENERAL ADULT MEDICAL EXAMINATION W/OUT ABNORMAL FINDINGS: ICD-10-CM

## 2024-12-03 ENCOUNTER — APPOINTMENT (OUTPATIENT)
Dept: ORTHOPEDIC SURGERY | Facility: CLINIC | Age: 54
End: 2024-12-03
Payer: MEDICAID

## 2024-12-03 VITALS — BODY MASS INDEX: 28.72 KG/M2 | WEIGHT: 183 LBS | HEIGHT: 67 IN

## 2024-12-03 DIAGNOSIS — M25.561 PAIN IN RIGHT KNEE: ICD-10-CM

## 2024-12-03 DIAGNOSIS — M22.2X1 PATELLOFEMORAL DISORDERS, RIGHT KNEE: ICD-10-CM

## 2024-12-03 PROCEDURE — 99204 OFFICE O/P NEW MOD 45 MIN: CPT

## 2024-12-03 PROCEDURE — 73564 X-RAY EXAM KNEE 4 OR MORE: CPT | Mod: RT

## 2024-12-03 RX ORDER — MELOXICAM 15 MG/1
15 TABLET ORAL
Qty: 60 | Refills: 1 | Status: ACTIVE | COMMUNITY
Start: 2024-12-03 | End: 1900-01-01

## 2024-12-30 NOTE — H&P PST ADULT - BIRTH SEX
Male Additional Notes: Pt advised to get Olumiant samples in [place] Render Risk Assessment In Note?: no Detail Level: Simple

## 2025-05-08 ENCOUNTER — RX RENEWAL (OUTPATIENT)
Age: 55
End: 2025-05-08

## 2025-07-11 NOTE — PACU DISCHARGE NOTE - NSPTMEETSDISCHCRITERIADT_GEN_A_CORE
26-Jan-2023 16:22
You can access the FollowMyHealth Patient Portal offered by [facility] by registering at the following website: [email or web address]. By joining [employer]’s FollowMyHealth portal, you will also be able to view your health information using other applications (apps) compatible with our [email or web address]